# Patient Record
Sex: FEMALE | Race: AMERICAN INDIAN OR ALASKA NATIVE | HISPANIC OR LATINO | Employment: FULL TIME | URBAN - METROPOLITAN AREA
[De-identification: names, ages, dates, MRNs, and addresses within clinical notes are randomized per-mention and may not be internally consistent; named-entity substitution may affect disease eponyms.]

---

## 2017-02-23 DIAGNOSIS — N92.0 MENORRHAGIA WITH REGULAR CYCLE: ICD-10-CM

## 2017-02-23 RX ORDER — ACETAMINOPHEN AND CODEINE PHOSPHATE 120; 12 MG/5ML; MG/5ML
1 SOLUTION ORAL DAILY
Qty: 84 TABLET | Refills: 1 | Status: SHIPPED | OUTPATIENT
Start: 2017-02-23 | End: 2017-05-16

## 2017-05-17 ENCOUNTER — OFFICE VISIT (OUTPATIENT)
Dept: FAMILY MEDICINE | Facility: CLINIC | Age: 42
End: 2017-05-17
Payer: COMMERCIAL

## 2017-05-17 VITALS
SYSTOLIC BLOOD PRESSURE: 115 MMHG | TEMPERATURE: 97.2 F | BODY MASS INDEX: 29.06 KG/M2 | WEIGHT: 148 LBS | HEART RATE: 90 BPM | DIASTOLIC BLOOD PRESSURE: 75 MMHG | HEIGHT: 60 IN | RESPIRATION RATE: 15 BRPM | OXYGEN SATURATION: 97 %

## 2017-05-17 DIAGNOSIS — E87.6 HYPOKALEMIA: ICD-10-CM

## 2017-05-17 DIAGNOSIS — I10 BENIGN ESSENTIAL HYPERTENSION: Primary | ICD-10-CM

## 2017-05-17 DIAGNOSIS — F32.1 MODERATE SINGLE CURRENT EPISODE OF MAJOR DEPRESSIVE DISORDER (H): ICD-10-CM

## 2017-05-17 DIAGNOSIS — N92.0 MENORRHAGIA WITH REGULAR CYCLE: ICD-10-CM

## 2017-05-17 LAB
ANION GAP SERPL CALCULATED.3IONS-SCNC: 9 MMOL/L (ref 3–14)
BUN SERPL-MCNC: 24 MG/DL (ref 7–30)
CALCIUM SERPL-MCNC: 9.1 MG/DL (ref 8.5–10.1)
CHLORIDE SERPL-SCNC: 101 MMOL/L (ref 94–109)
CO2 SERPL-SCNC: 29 MMOL/L (ref 20–32)
CREAT SERPL-MCNC: 0.87 MG/DL (ref 0.52–1.04)
GFR SERPL CREATININE-BSD FRML MDRD: 71 ML/MIN/1.7M2
GLUCOSE SERPL-MCNC: 112 MG/DL (ref 70–99)
POTASSIUM SERPL-SCNC: 3.1 MMOL/L (ref 3.4–5.3)
SODIUM SERPL-SCNC: 139 MMOL/L (ref 133–144)

## 2017-05-17 PROCEDURE — 36415 COLL VENOUS BLD VENIPUNCTURE: CPT | Performed by: FAMILY MEDICINE

## 2017-05-17 PROCEDURE — 99214 OFFICE O/P EST MOD 30 MIN: CPT | Performed by: FAMILY MEDICINE

## 2017-05-17 PROCEDURE — 80048 BASIC METABOLIC PNL TOTAL CA: CPT | Performed by: FAMILY MEDICINE

## 2017-05-17 RX ORDER — ACETAMINOPHEN AND CODEINE PHOSPHATE 120; 12 MG/5ML; MG/5ML
1 SOLUTION ORAL DAILY
Qty: 84 TABLET | Refills: 3 | Status: SHIPPED | OUTPATIENT
Start: 2017-05-17 | End: 2018-04-20

## 2017-05-17 RX ORDER — CHLORTHALIDONE 50 MG/1
50 TABLET ORAL DAILY
Qty: 90 TABLET | Refills: 1 | Status: SHIPPED | OUTPATIENT
Start: 2017-05-17 | End: 2017-11-08

## 2017-05-17 RX ORDER — LISINOPRIL 10 MG/1
10 TABLET ORAL DAILY
Qty: 90 TABLET | Refills: 1 | Status: SHIPPED | OUTPATIENT
Start: 2017-05-17 | End: 2017-11-08

## 2017-05-17 ASSESSMENT — PAIN SCALES - GENERAL: PAINLEVEL: NO PAIN (0)

## 2017-05-17 ASSESSMENT — ANXIETY QUESTIONNAIRES
7. FEELING AFRAID AS IF SOMETHING AWFUL MIGHT HAPPEN: NOT AT ALL
3. WORRYING TOO MUCH ABOUT DIFFERENT THINGS: NEARLY EVERY DAY
5. BEING SO RESTLESS THAT IT IS HARD TO SIT STILL: NOT AT ALL
GAD7 TOTAL SCORE: 10
IF YOU CHECKED OFF ANY PROBLEMS ON THIS QUESTIONNAIRE, HOW DIFFICULT HAVE THESE PROBLEMS MADE IT FOR YOU TO DO YOUR WORK, TAKE CARE OF THINGS AT HOME, OR GET ALONG WITH OTHER PEOPLE: SOMEWHAT DIFFICULT
6. BECOMING EASILY ANNOYED OR IRRITABLE: NEARLY EVERY DAY
2. NOT BEING ABLE TO STOP OR CONTROL WORRYING: MORE THAN HALF THE DAYS
1. FEELING NERVOUS, ANXIOUS, OR ON EDGE: NOT AT ALL

## 2017-05-17 ASSESSMENT — PATIENT HEALTH QUESTIONNAIRE - PHQ9: 5. POOR APPETITE OR OVEREATING: MORE THAN HALF THE DAYS

## 2017-05-17 NOTE — PROGRESS NOTES
"  SUBJECTIVE:                                                    Sue Delgado is a 42 year old female who presents to clinic today for the following health issues:      Hypertension Follow-up      Outpatient blood pressures are not being checked.    Low Salt Diet: low salt       Amount of exercise or physical activity: 2-3 days/week for an average of 30-45 minutes    Problems taking medications regularly: No    Medication side effects: none    Diet: regular (no restrictions)    Pt with HTN, well controlled on medication/ due for labwork  On OCPs and working well. Needs refill. UTD on pap smear    Patient would like to discuss feeling emotional lately, she is concerned she maybe starting Menopause.  Pt crying most days. Feeling depressed. No thoughts of hurting herself. Is struggling with her marriage.   Did see marriage counselor but then  broke his shoulder and has not been back. She did not find her helpful.   Is willing to try individual therapy as she is not sure she wants to stay in the marriage.       Problem list and histories reviewed & adjusted, as indicated.  Additional history: as documented    Labs reviewed in EPIC    Reviewed and updated as needed this visit by clinical staff  Tobacco  Meds  Med Hx  Surg Hx  Fam Hx  Soc Hx      Reviewed and updated as needed this visit by Provider         ROS:  Constitutional, HEENT, cardiovascular, pulmonary, gi and gu systems are negative, except as otherwise noted.    OBJECTIVE:                                                    /75  Pulse 90  Temp 97.2  F (36.2  C) (Oral)  Resp 15  Ht 4' 11.65\" (1.515 m)  Wt 148 lb (67.1 kg)  SpO2 97%  Breastfeeding? No  BMI 29.25 kg/m2  Body mass index is 29.25 kg/(m^2).  GENERAL: healthy, alert and no distress  NECK: no adenopathy, no asymmetry, masses, or scars and thyroid normal to palpation  RESP: lungs clear to auscultation - no rales, rhonchi or wheezes  CV: regular rate and rhythm, " normal S1 S2, no S3 or S4, no murmur, click or rub, no peripheral edema and peripheral pulses strong  ABDOMEN: soft, nontender, no hepatosplenomegaly, no masses and bowel sounds normal  MS: no gross musculoskeletal defects noted, no edema    Diagnostic Test Results:  none      ASSESSMENT/PLAN:                                                            1. Benign essential hypertension  At goal on meds, fu in 6 months  - Basic metabolic panel  - chlorthalidone (HYGROTON) 50 MG tablet; Take 1 tablet (50 mg) by mouth daily  Dispense: 90 tablet; Refill: 1  - lisinopril (PRINIVIL/ZESTRIL) 10 MG tablet; Take 1 tablet (10 mg) by mouth daily  Dispense: 90 tablet; Refill: 1    2. Menorrhagia with regular cycle  Refill as needed  - norethindrone (MICRONOR) 0.35 MG per tablet; Take 1 tablet (0.35 mg) by mouth daily  Dispense: 84 tablet; Refill: 3    3. Moderate single current episode of major depressive disorder (H)  To fu with [facility].           Radha Marcial MD  [facility]

## 2017-05-17 NOTE — MR AVS SNAPSHOT
"              After Visit Summary   5/17/2017    Sue Delgado    MRN: [id number]           Patient Information     Date Of Birth          1975        Visit Information        Provider Department      5/17/2017 3:40 PM Radha Garnett MD [facility]        Today's Diagnoses     Benign essential hypertension    -  1    Menorrhagia with regular cycle        Moderate single current episode of major depressive disorder (H)           Follow-ups after your visit        Who to contact     If you have questions or need follow up information about today's clinic visit or your schedule please contact [facility] directly at [phone].  Normal or non-critical lab and imaging results will be communicated to you by MyChart, letter or phone within 4 business days after the clinic has received the results. If you do not hear from us within 7 days, please contact the clinic through Diaphonicshart or phone. If you have a critical or abnormal lab result, we will notify you by phone as soon as possible.  Submit refill requests through Beauty [employer]ed or call your pharmacy and they will forward the refill request to us. Please allow 3 business days for your refill to be completed.          Additional Information About Your Visit        MyChart Information     [employer] gives you secure access to your electronic health record. If you see a primary care provider, you can also send messages to your care team and make appointments. If you have questions, please call your primary care clinic.  If you do not have a primary care provider, please call [phone] and they will assist you.        Care EveryWhere ID     This is your Care EveryWhere ID. This could be used by other organizations to access your [place] medical records  [id number]        Your Vitals Were     Pulse Temperature Respirations Height Pulse Oximetry Breastfeeding?    90 97.2  F (36.2  C) (Oral) 15 4' 11.65\" (1.515 m) 97% No    BMI (Body Mass " Index)                   29.25 kg/m2            Blood Pressure from Last 3 Encounters:   05/17/17 115/75   11/14/16 139/86   10/20/16 120/71    Weight from Last 3 Encounters:   05/17/17 148 lb (67.1 kg)   11/14/16 163 lb (73.9 kg)   10/20/16 158 lb (71.7 kg)              We Performed the Following     Basic metabolic panel          Where to get your medicines      These medications were sent to [facility] [address], [address]  [address], [address], [facility] [address]     Phone:  [phone]     chlorthalidone 50 MG tablet    lisinopril 10 MG tablet    norethindrone 0.35 MG per tablet          Primary Care Provider Office Phone # Fax #    Radha Garnett [phone]       [facility] [address] [facility] [address]        Thank you!     Thank you for choosing [facility]  for your care. Our goal is always to provide you with excellent care. Hearing back from our patients is one way we can continue to improve our services. Please take a few minutes to complete the written survey that you may receive in the mail after your visit with us. Thank you!             Your Updated Medication List - Protect others around you: Learn how to safely use, store and throw away your medicines at www.disposemymeds.org.          This list is accurate as of: 5/17/17  6:14 PM.  Always use your most recent med list.                   Brand Name Dispense Instructions for use    chlorthalidone 50 MG tablet    HYGROTON    90 tablet    Take 1 tablet (50 mg) by mouth daily       lisinopril 10 MG tablet    PRINIVIL/ZESTRIL    90 tablet    Take 1 tablet (10 mg) by mouth daily       norethindrone 0.35 MG per tablet    MICRONOR    84 tablet    Take 1 tablet (0.35 mg) by mouth daily

## 2017-05-18 ASSESSMENT — PATIENT HEALTH QUESTIONNAIRE - PHQ9: SUM OF ALL RESPONSES TO PHQ QUESTIONS 1-9: 16

## 2017-05-18 ASSESSMENT — ANXIETY QUESTIONNAIRES: GAD7 TOTAL SCORE: 10

## 2017-07-14 ENCOUNTER — TELEPHONE (OUTPATIENT)
Dept: FAMILY MEDICINE | Facility: CLINIC | Age: 42
End: 2017-07-14

## 2017-11-08 ENCOUNTER — OFFICE VISIT (OUTPATIENT)
Dept: FAMILY MEDICINE | Facility: CLINIC | Age: 42
End: 2017-11-08
Payer: COMMERCIAL

## 2017-11-08 VITALS
WEIGHT: 140 LBS | TEMPERATURE: 97.1 F | HEART RATE: 71 BPM | DIASTOLIC BLOOD PRESSURE: 66 MMHG | BODY MASS INDEX: 27.67 KG/M2 | SYSTOLIC BLOOD PRESSURE: 118 MMHG

## 2017-11-08 DIAGNOSIS — I10 BENIGN ESSENTIAL HYPERTENSION: ICD-10-CM

## 2017-11-08 LAB
ANION GAP SERPL CALCULATED.3IONS-SCNC: 8 MMOL/L (ref 3–14)
BUN SERPL-MCNC: 18 MG/DL (ref 7–30)
CALCIUM SERPL-MCNC: 9.2 MG/DL (ref 8.5–10.1)
CHLORIDE SERPL-SCNC: 101 MMOL/L (ref 94–109)
CO2 SERPL-SCNC: 28 MMOL/L (ref 20–32)
CREAT SERPL-MCNC: 0.64 MG/DL (ref 0.52–1.04)
GFR SERPL CREATININE-BSD FRML MDRD: >90 ML/MIN/1.7M2
GLUCOSE SERPL-MCNC: 105 MG/DL (ref 70–99)
POTASSIUM SERPL-SCNC: 3.1 MMOL/L (ref 3.4–5.3)
SODIUM SERPL-SCNC: 137 MMOL/L (ref 133–144)

## 2017-11-08 PROCEDURE — 99213 OFFICE O/P EST LOW 20 MIN: CPT | Performed by: FAMILY MEDICINE

## 2017-11-08 PROCEDURE — 80048 BASIC METABOLIC PNL TOTAL CA: CPT | Performed by: FAMILY MEDICINE

## 2017-11-08 PROCEDURE — 36415 COLL VENOUS BLD VENIPUNCTURE: CPT | Performed by: FAMILY MEDICINE

## 2017-11-08 RX ORDER — CHLORTHALIDONE 50 MG/1
50 TABLET ORAL DAILY
Qty: 90 TABLET | Refills: 1 | Status: SHIPPED | OUTPATIENT
Start: 2017-11-08 | End: 2018-04-26

## 2017-11-08 RX ORDER — LISINOPRIL 10 MG/1
10 TABLET ORAL DAILY
Qty: 90 TABLET | Refills: 1 | Status: SHIPPED | OUTPATIENT
Start: 2017-11-08 | End: 2018-04-26

## 2017-11-08 ASSESSMENT — ANXIETY QUESTIONNAIRES
3. WORRYING TOO MUCH ABOUT DIFFERENT THINGS: SEVERAL DAYS
IF YOU CHECKED OFF ANY PROBLEMS ON THIS QUESTIONNAIRE, HOW DIFFICULT HAVE THESE PROBLEMS MADE IT FOR YOU TO DO YOUR WORK, TAKE CARE OF THINGS AT HOME, OR GET ALONG WITH OTHER PEOPLE: SOMEWHAT DIFFICULT
1. FEELING NERVOUS, ANXIOUS, OR ON EDGE: NOT AT ALL
5. BEING SO RESTLESS THAT IT IS HARD TO SIT STILL: NOT AT ALL
7. FEELING AFRAID AS IF SOMETHING AWFUL MIGHT HAPPEN: SEVERAL DAYS
GAD7 TOTAL SCORE: 5
2. NOT BEING ABLE TO STOP OR CONTROL WORRYING: SEVERAL DAYS
6. BECOMING EASILY ANNOYED OR IRRITABLE: SEVERAL DAYS

## 2017-11-08 ASSESSMENT — PATIENT HEALTH QUESTIONNAIRE - PHQ9
SUM OF ALL RESPONSES TO PHQ QUESTIONS 1-9: 10
5. POOR APPETITE OR OVEREATING: SEVERAL DAYS

## 2017-11-08 NOTE — PROGRESS NOTES
SUBJECTIVE:   Sue Delgado is a 42 year old female who presents to clinic today for the following health issues:        Hypertension Follow-up      Outpatient blood pressures are not being checked.    Low Salt Diet: not monitoring salt    Insomnia, tried nyquil and melatonin going through a divorce       Amount of exercise or physical activity: 5 days a week     Problems taking medications regularly: No    Medication side effects: none    Diet: regular (no restrictions)        Pt with HTN, well controlled on meds,   No concerns per pt.     Problem list and histories reviewed & adjusted, as indicated.  Additional history: as documented    Labs reviewed in EPIC    Reviewed and updated as needed this visit by clinical staffTobacco  Allergies  Meds       Reviewed and updated as needed this visit by Provider         ROS:  Constitutional, HEENT, cardiovascular, pulmonary, gi and gu systems are negative, except as otherwise noted.      OBJECTIVE:   /66  Pulse 71  Temp 97.1  F (36.2  C) (Oral)  Wt 140 lb (63.5 kg)  BMI 27.67 kg/m2  Body mass index is 27.67 kg/(m^2).  GENERAL: healthy, alert and no distress  NECK: no adenopathy, no asymmetry, masses, or scars and thyroid normal to palpation  RESP: lungs clear to auscultation - no rales, rhonchi or wheezes  CV: regular rate and rhythm, normal S1 S2, no S3 or S4, no murmur, click or rub, no peripheral edema and peripheral pulses strong  ABDOMEN: soft, nontender, no hepatosplenomegaly, no masses and bowel sounds normal  MS: no gross musculoskeletal defects noted, no edema    Diagnostic Test Results:  none     ASSESSMENT/PLAN:     1. Benign essential hypertension  At goal on meds, fu in 6-12 months  - Basic metabolic panel  - chlorthalidone (HYGROTON) 50 MG tablet; Take 1 tablet (50 mg) by mouth daily  Dispense: 90 tablet; Refill: 1  - lisinopril (PRINIVIL/ZESTRIL) 10 MG tablet; Take 1 tablet (10 mg) by mouth daily  Dispense: 90 tablet; Refill:  1        Radha Marcial MD  [facility]

## 2017-11-08 NOTE — MR AVS SNAPSHOT
After Visit Summary   11/8/2017    Sue Delgado    MRN: [id number]           Patient Information     Date Of Birth          1975        Visit Information        Provider Department      11/8/2017 12:00 PM Radha Garnett MD [facility]        Today's Diagnoses     Benign essential hypertension           Follow-ups after your visit        Who to contact     If you have questions or need follow up information about today's clinic visit or your schedule please contact [facility] directly at [phone].  Normal or non-critical lab and imaging results will be communicated to you by MyChart, letter or phone within 4 business days after the clinic has received the results. If you do not hear from us within 7 days, please contact the clinic through SpunLivet or phone. If you have a critical or abnormal lab result, we will notify you by phone as soon as possible.  Submit refill requests through [employer] or call your pharmacy and they will forward the refill request to us. Please allow 3 business days for your refill to be completed.          Additional Information About Your Visit        MyChart Information     [employer] gives you secure access to your electronic health record. If you see a primary care provider, you can also send messages to your care team and make appointments. If you have questions, please call your primary care clinic.  If you do not have a primary care provider, please call [phone] and they will assist you.        Care EveryWhere ID     This is your Care EveryWhere ID. This could be used by other organizations to access your [place] medical records  [id number]        Your Vitals Were     Pulse Temperature BMI (Body Mass Index)             71 97.1  F (36.2  C) (Oral) 27.67 kg/m2          Blood Pressure from Last 3 Encounters:   11/08/17 118/66   05/17/17 115/75   11/14/16 139/86    Weight from Last 3 Encounters:   11/08/17 140 lb (63.5 kg)    05/17/17 148 lb (67.1 kg)   11/14/16 163 lb (73.9 kg)              We Performed the Following     Basic metabolic panel          Where to get your medicines      These medications were sent to [facility] [place], [place] - [address] [address]  [address] [address], [address], [facility] [address]     Phone:  [phone]     chlorthalidone 50 MG tablet    lisinopril 10 MG tablet          Primary Care Provider Office Phone # Fax #    Radha Garnett [phone]       [address] [facility] [address]        Equal Access to Services     [facility]: Hadii jacob lucio Soian, waaxda luqadaha, qaybta kaalmada adelillieyanalini, edita benson . So [facility] [phone].    ATENCIÓN: Si habla español, tiene a peoples disposición servicios gratuitos de asistencia lingüística. [facility] [phone].    We comply with applicable federal civil rights laws and [place] laws. We do not discriminate on the basis of race, color, national origin, age, disability, sex, sexual orientation, or gender identity.            Thank you!     Thank you for choosing [facility]  for your care. Our goal is always to provide you with excellent care. Hearing back from our patients is one way we can continue to improve our services. Please take a few minutes to complete the written survey that you may receive in the mail after your visit with us. Thank you!             Your Updated Medication List - Protect others around you: Learn how to safely use, store and throw away your medicines at www.disposemymeds.org.          This list is accurate as of: 11/8/17  3:54 PM.  Always use your most recent med list.                   Brand Name Dispense Instructions for use Diagnosis    chlorthalidone 50 MG tablet    HYGROTON    90 tablet    Take 1 tablet (50 mg) by mouth daily    Benign essential hypertension       lisinopril 10 MG tablet    PRINIVIL/ZESTRIL    90 tablet    Take 1 tablet  (10 mg) by mouth daily    Benign essential hypertension       norethindrone 0.35 MG per tablet    MICRONOR    84 tablet    Take 1 tablet (0.35 mg) by mouth daily    Menorrhagia with regular cycle

## 2017-11-08 NOTE — NURSING NOTE
"Chief Complaint   Patient presents with     Insomnia     Hypertension       Initial /66  Pulse 71  Temp 97.1  F (36.2  C) (Oral)  Wt 140 lb (63.5 kg)  BMI 27.67 kg/m2 Estimated body mass index is 27.67 kg/(m^2) as calculated from the following:    Height as of 5/17/17: 4' 11.65\" (1.515 m).    Weight as of this encounter: 140 lb (63.5 kg).  Medication Reconciliation: irineo Lakhani MA      "

## 2017-11-09 ASSESSMENT — ANXIETY QUESTIONNAIRES: GAD7 TOTAL SCORE: 5

## 2018-03-28 ENCOUNTER — OFFICE VISIT (OUTPATIENT)
Dept: FAMILY MEDICINE | Facility: CLINIC | Age: 43
End: 2018-03-28
Payer: COMMERCIAL

## 2018-03-28 VITALS
DIASTOLIC BLOOD PRESSURE: 66 MMHG | TEMPERATURE: 97.4 F | RESPIRATION RATE: 16 BRPM | WEIGHT: 136 LBS | OXYGEN SATURATION: 96 % | SYSTOLIC BLOOD PRESSURE: 115 MMHG | HEIGHT: 60 IN | BODY MASS INDEX: 26.7 KG/M2 | HEART RATE: 85 BPM

## 2018-03-28 DIAGNOSIS — R68.89 FLU-LIKE SYMPTOMS: Primary | ICD-10-CM

## 2018-03-28 PROCEDURE — 99213 OFFICE O/P EST LOW 20 MIN: CPT | Performed by: FAMILY MEDICINE

## 2018-03-28 NOTE — PROGRESS NOTES
SUBJECTIVE:   Sue Delgado is a 43 year old female who presents to clinic today for the following health issues:      ENT Symptoms             Symptoms: cc Present Absent Comment   Fever/Chills  x     Fatigue  x     Muscle Aches  x     Eye Irritation   x    Sneezing   x    Nasal Medardo/Drg  x     Sinus Pressure/Pain  x     Loss of smell  x     Dental pain   x    Sore Throat  x  Slight    Swollen Glands   x    Ear Pain/Fullness  x     Cough  x     Wheeze  x     Chest Pain  x  With cough    Shortness of breath   x    Rash   x    Other  x  Light headed     Symptom duration:  7 days    Symptom severity:     Treatments tried: Night quil and day quil, robitussin, vitamin c    Contacts:  work     Needs a note for work    Pt with symptoms above consistent with flu.  She needs note for work  She is starting to feel better just a cough remaining          Problem list and histories reviewed & adjusted, as indicated.  Additional history: as documented    Labs reviewed in EPIC    Reviewed and updated as needed this visit by clinical staff  Tobacco  Allergies  Meds  Med Hx  Surg Hx  Fam Hx  Soc Hx      Reviewed and updated as needed this visit by Provider         ROS:  Constitutional, HEENT, cardiovascular, pulmonary, gi and gu systems are negative, except as otherwise noted.    OBJECTIVE:     /66  Pulse 85  Temp 97.4  F (36.3  C) (Oral)  Resp 16  Ht 5' (1.524 m)  Wt 136 lb (61.7 kg)  SpO2 96%  BMI 26.56 kg/m2  Body mass index is 26.56 kg/(m^2).  GENERAL: healthy, alert and no distress  EYES: Eyes grossly normal to inspection, PERRL and conjunctivae and sclerae normal  HENT: ear canals and TM's normal, nose and mouth without ulcers or lesions  NECK: no adenopathy, no asymmetry, masses, or scars and thyroid normal to palpation  RESP: lungs clear to auscultation - no rales, rhonchi or wheezes  CV: regular rate and rhythm, normal S1 S2, no S3 or S4, no murmur, click or rub, no peripheral edema and  peripheral pulses strong    Diagnostic Test Results:  none     ASSESSMENT/PLAN:     1. Flu-like symptoms  Symptomatic treatment, note for work, okay to return  FU with new or worsening symptoms          Radha Marcial MD  [facility]

## 2018-03-28 NOTE — MR AVS SNAPSHOT
After Visit Summary   3/28/2018    Sue Delgado    MRN: [id number]           Patient Information     Date Of Birth          1975        Visit Information        Provider Department      3/28/2018 2:00 PM Radha Garnett MD [facility]        Today's Diagnoses     Flu-like symptoms    -  1       Follow-ups after your visit        Who to contact     If you have questions or need follow up information about today's clinic visit or your schedule please contact [facility] directly at [phone].  Normal or non-critical lab and imaging results will be communicated to you by Smappohart, letter or phone within 4 business days after the clinic has received the results. If you do not hear from us within 7 days, please contact the clinic through Nujirat or phone. If you have a critical or abnormal lab result, we will notify you by phone as soon as possible.  Submit refill requests through [employer] or call your pharmacy and they will forward the refill request to us. Please allow 3 business days for your refill to be completed.          Additional Information About Your Visit        MyChart Information     [employer] gives you secure access to your electronic health record. If you see a primary care provider, you can also send messages to your care team and make appointments. If you have questions, please call your primary care clinic.  If you do not have a primary care provider, please call [phone] and they will assist you.        Care EveryWhere ID     This is your Care EveryWhere ID. This could be used by other organizations to access your [facility] medical records  [id number]        Your Vitals Were     Pulse Temperature Respirations Height Pulse Oximetry BMI (Body Mass Index)    85 97.4  F (36.3  C) (Oral) 16 5' (1.524 m) 96% 26.56 kg/m2       Blood Pressure from Last 3 Encounters:   03/28/18 115/66   11/08/17 118/66   05/17/17 115/75    Weight from Last 3  Encounters:   03/28/18 136 lb (61.7 kg)   11/08/17 140 lb (63.5 kg)   05/17/17 148 lb (67.1 kg)              Today, you had the following     No orders found for display       Primary Care Provider Office Phone # Fax #    Radha Garnett [phone] [address] [facility] [address]        Equal Access to Services     MARCELLE CUEVAS : Hadii aad ku hadasho Soomaali, waaxda luqadaha, qaybta kaalmada adeegyada, waxay idiin hayaan adeeg jamildeisipavan labritt . So [facility] [phone].    ATENCIÓN: Si audila [place], tiene a peoples disposición servicios gratuitos de asistencia lingüística. Llame al [phone].    We comply with applicable federal civil rights laws and [place] laws. We do not discriminate on the basis of race, color, national origin, age, disability, sex, sexual orientation, or gender identity.            Thank you!     Thank you for choosing [facility]  for your care. Our goal is always to provide you with excellent care. Hearing back from our patients is one way we can continue to improve our services. Please take a few minutes to complete the written survey that you may receive in the mail after your visit with us. Thank you!             Your Updated Medication List - Protect others around you: Learn how to safely use, store and throw away your medicines at www.disposemymeds.org.          This list is accurate as of 3/28/18  2:26 PM.  Always use your most recent med list.                   Brand Name Dispense Instructions for use Diagnosis    chlorthalidone 50 MG tablet    HYGROTON    90 tablet    Take 1 tablet (50 mg) by mouth daily    Benign essential hypertension       lisinopril 10 MG tablet    PRINIVIL/ZESTRIL    90 tablet    Take 1 tablet (10 mg) by mouth daily    Benign essential hypertension       norethindrone 0.35 MG per tablet    MICRONOR    84 tablet    Take 1 tablet (0.35 mg) by mouth daily    Menorrhagia with regular cycle

## 2018-04-04 ENCOUNTER — TELEPHONE (OUTPATIENT)
Dept: FAMILY MEDICINE | Facility: CLINIC | Age: 43
End: 2018-04-04

## 2018-04-20 DIAGNOSIS — N92.0 MENORRHAGIA WITH REGULAR CYCLE: ICD-10-CM

## 2018-04-20 RX ORDER — ACETAMINOPHEN AND CODEINE PHOSPHATE 120; 12 MG/5ML; MG/5ML
1 SOLUTION ORAL DAILY
Qty: 84 TABLET | Refills: 0 | Status: SHIPPED | OUTPATIENT
Start: 2018-04-20 | End: 2018-04-26

## 2018-04-20 NOTE — LETTER
April 23, 2018    Sue Delgado  [address] [facility] [address]    Dear Sue,       We recently received a refill request for norethindrone (MICRONOR) 0.35 MG per tablet.  We have refilled this for a one time supply only because you are due for a:    Blood pressure/Birth control office visit      Please call at your earliest convenience so that there will not be a delay with your future refills.          Thank you,   Your [facility] Team/mkr  [phone]

## 2018-04-26 NOTE — PROGRESS NOTES
SUBJECTIVE:   CC: Sue Delgado is an 43 year old woman who presents for preventive health visit.     Physical   Annual:     Getting at least 3 servings of Calcium per day::  NO    Bi-annual eye exam::  Yes    Dental care twice a year::  NO    Sleep apnea or symptoms of sleep apnea::  Daytime drowsiness    Diet::  Regular (no restrictions)    Frequency of exercise::  2-3 days/week    Duration of exercise::  15-30 minutes    Taking medications regularly::  Yes    Medication side effects::  Not applicable    Additional concerns today::  YES                  Today's PHQ-2 Score:   PHQ-2 ( 1999 [employer]) 4/30/2018   Q1: Little interest or pleasure in doing things 0   Q2: Feeling down, depressed or hopeless 0   PHQ-2 Score 0   Q1: Little interest or pleasure in doing things Not at all   Q2: Feeling down, depressed or hopeless Not at all   PHQ-2 Score 0       Abuse: Current or Past(Physical, Sexual or Emotional)- No  Do you feel safe in your environment - Yes    Social History   Substance Use Topics     Smoking status: Never Smoker     Smokeless tobacco: Never Used     Alcohol use Yes     Alcohol Use 4/30/2018   If you drink alcohol do you typically have greater than 3 drinks per day OR greater than 7 drinks per week? No   No flowsheet data found. less than 7 drinks per week.    Reviewed orders with patient.  Reviewed health maintenance and updated orders accordingly - Yes  Labs reviewed in EPIC    Patient under age 50, mutual decision reflected in health maintenance.      Pertinent mammograms are reviewed under the imaging tab.  History of abnormal Pap smear: NO - age 30-65 PAP every 5 years with negative HPV co-testing recommended    Reviewed and updated as needed this visit by clinical staff  Tobacco  Allergies  Meds  Med Hx  Surg Hx  Fam Hx  Soc Hx        Reviewed and updated as needed this visit by Provider            Review of Systems  CONSTITUTIONAL: NEGATIVE for fever, chills, change in  weight  INTEGUMENTARU/SKIN: NEGATIVE for worrisome rashes, moles or lesions  EYES: NEGATIVE for vision changes or irritation  ENT: NEGATIVE for ear, mouth and throat problems  RESP: NEGATIVE for significant cough or SOB  BREAST: NEGATIVE for masses, tenderness or discharge  CV: NEGATIVE for chest pain, palpitations or peripheral edema  GI: NEGATIVE for nausea, abdominal pain, heartburn, or change in bowel habits  : NEGATIVE for unusual urinary or vaginal symptoms. Periods are regular.  MUSCULOSKELETAL: NEGATIVE for significant arthralgias or myalgia  NEURO: NEGATIVE for weakness, dizziness or paresthesias  PSYCHIATRIC: NEGATIVE for changes in mood or affect     OBJECTIVE:   /72  Pulse 86  Temp 97.8  F (36.6  C) (Oral)  Resp 15  Ht 5' (1.524 m)  Wt 136 lb (61.7 kg)  LMP 04/02/2018  BMI 26.56 kg/m2  Physical Exam  GENERAL: healthy, alert and no distress  EYES: Eyes grossly normal to inspection, PERRL and conjunctivae and sclerae normal  HENT: ear canals and TM's normal, nose and mouth without ulcers or lesions  NECK: no adenopathy, no asymmetry, masses, or scars and thyroid normal to palpation  RESP: lungs clear to auscultation - no rales, rhonchi or wheezes  BREAST: normal without masses, tenderness or nipple discharge and no palpable axillary masses or adenopathy  CV: regular rate and rhythm, normal S1 S2, no S3 or S4, no murmur, click or rub, no peripheral edema and peripheral pulses strong  ABDOMEN: soft, nontender, no hepatosplenomegaly, no masses and bowel sounds normal   (female): normal female external genitalia, normal urethral meatus, vaginal mucosa pink, moist, well rugated, and normal cervix/adnexa/uterus without masses or discharge  MS: no gross musculoskeletal defects noted, no edema  SKIN: no suspicious lesions or rashes  NEURO: Normal strength and tone, mentation intact and speech normal  PSYCH: mentation appears normal, affect normal/bright    ASSESSMENT/PLAN:   1. Routine general  medical examination at a health care facility      2. Benign essential hypertension  At goal on meds, fi in one year  - chlorthalidone (HYGROTON) 50 MG tablet; Take 1 tablet (50 mg) by mouth daily  Dispense: 90 tablet; Refill: 3  - lisinopril (PRINIVIL/ZESTRIL) 10 MG tablet; Take 1 tablet (10 mg) by mouth daily  Dispense: 90 tablet; Refill: 3  - Basic metabolic panel    3. Menorrhagia with regular cycle  Refill is working well  - norethindrone (MICRONOR) 0.35 MG per tablet; Take 1 tablet (0.35 mg) by mouth daily  Dispense: 84 tablet; Refill: 3    4. CARDIOVASCULAR SCREENING; LDL GOAL LESS THAN 160    - Lipid panel reflex to direct LDL Fasting    5. Encounter for screening mammogram for breast cancer    - MA Screening Digital Bilateral; Future    6. Screening for malignant neoplasm of cervix    - Pap imaged thin layer screen with HPV - recommended age 30 - 65 years (select HPV order below)  - HPV High Risk Types DNA Cervical    COUNSELING:  Reviewed preventive health counseling, as reflected in patient instructions       Regular exercise       Healthy diet/nutrition       Vision screening       Contraception         reports that she has never smoked. She has never used smokeless tobacco.    Estimated body mass index is 26.56 kg/(m^2) as calculated from the following:    Height as of this encounter: 5' (1.524 m).    Weight as of this encounter: 136 lb (61.7 kg).   Weight management plan: Discussed healthy diet and exercise guidelines and patient will follow up in 12 months in clinic to re-evaluate.    Counseling Resources:  ATP IV Guidelines  Pooled Cohorts Equation Calculator  Breast Cancer Risk Calculator  FRAX Risk Assessment  ICSI Preventive Guidelines  Dietary Guidelines for Americans, 2010  [employer]'s MyPlate  ASA Prophylaxis  Lung CA Screening    Radha Marcial MD  [facility]  Answers for HPI/ROS submitted by the patient on 4/30/2018   PHQ-2 Score: 0

## 2018-04-30 ENCOUNTER — OFFICE VISIT (OUTPATIENT)
Dept: FAMILY MEDICINE | Facility: CLINIC | Age: 43
End: 2018-04-30
Payer: COMMERCIAL

## 2018-04-30 VITALS
RESPIRATION RATE: 15 BRPM | SYSTOLIC BLOOD PRESSURE: 129 MMHG | TEMPERATURE: 97.8 F | WEIGHT: 136 LBS | HEIGHT: 60 IN | HEART RATE: 86 BPM | DIASTOLIC BLOOD PRESSURE: 72 MMHG | BODY MASS INDEX: 26.7 KG/M2

## 2018-04-30 DIAGNOSIS — Z13.6 CARDIOVASCULAR SCREENING; LDL GOAL LESS THAN 160: ICD-10-CM

## 2018-04-30 DIAGNOSIS — N92.0 MENORRHAGIA WITH REGULAR CYCLE: ICD-10-CM

## 2018-04-30 DIAGNOSIS — Z12.4 SCREENING FOR MALIGNANT NEOPLASM OF CERVIX: ICD-10-CM

## 2018-04-30 DIAGNOSIS — I10 BENIGN ESSENTIAL HYPERTENSION: ICD-10-CM

## 2018-04-30 DIAGNOSIS — Z12.31 ENCOUNTER FOR SCREENING MAMMOGRAM FOR BREAST CANCER: ICD-10-CM

## 2018-04-30 DIAGNOSIS — Z00.00 ROUTINE GENERAL MEDICAL EXAMINATION AT A HEALTH CARE FACILITY: Primary | ICD-10-CM

## 2018-04-30 LAB
ANION GAP SERPL CALCULATED.3IONS-SCNC: 6 MMOL/L (ref 3–14)
BUN SERPL-MCNC: 15 MG/DL (ref 7–30)
CALCIUM SERPL-MCNC: 8.7 MG/DL (ref 8.5–10.1)
CHLORIDE SERPL-SCNC: 103 MMOL/L (ref 94–109)
CHOLEST SERPL-MCNC: 163 MG/DL
CO2 SERPL-SCNC: 29 MMOL/L (ref 20–32)
CREAT SERPL-MCNC: 0.7 MG/DL (ref 0.52–1.04)
GFR SERPL CREATININE-BSD FRML MDRD: >90 ML/MIN/1.7M2
GLUCOSE SERPL-MCNC: 98 MG/DL (ref 70–99)
HDLC SERPL-MCNC: 64 MG/DL
LDLC SERPL CALC-MCNC: 90 MG/DL
NONHDLC SERPL-MCNC: 99 MG/DL
POTASSIUM SERPL-SCNC: 3.1 MMOL/L (ref 3.4–5.3)
SODIUM SERPL-SCNC: 138 MMOL/L (ref 133–144)
TRIGL SERPL-MCNC: 44 MG/DL

## 2018-04-30 PROCEDURE — 80048 BASIC METABOLIC PNL TOTAL CA: CPT | Performed by: FAMILY MEDICINE

## 2018-04-30 PROCEDURE — 99396 PREV VISIT EST AGE 40-64: CPT | Performed by: FAMILY MEDICINE

## 2018-04-30 PROCEDURE — 87624 HPV HI-RISK TYP POOLED RSLT: CPT | Performed by: FAMILY MEDICINE

## 2018-04-30 PROCEDURE — G0145 SCR C/V CYTO,THINLAYER,RESCR: HCPCS | Performed by: FAMILY MEDICINE

## 2018-04-30 PROCEDURE — 36415 COLL VENOUS BLD VENIPUNCTURE: CPT | Performed by: FAMILY MEDICINE

## 2018-04-30 PROCEDURE — 80061 LIPID PANEL: CPT | Performed by: FAMILY MEDICINE

## 2018-04-30 RX ORDER — LISINOPRIL 10 MG/1
10 TABLET ORAL DAILY
Qty: 90 TABLET | Refills: 3 | Status: SHIPPED | OUTPATIENT
Start: 2018-04-30 | End: 2019-05-07

## 2018-04-30 RX ORDER — ACETAMINOPHEN AND CODEINE PHOSPHATE 120; 12 MG/5ML; MG/5ML
1 SOLUTION ORAL DAILY
Qty: 84 TABLET | Refills: 3 | Status: SHIPPED | OUTPATIENT
Start: 2018-04-30 | End: 2021-06-10

## 2018-04-30 RX ORDER — CHLORTHALIDONE 50 MG/1
50 TABLET ORAL DAILY
Qty: 90 TABLET | Refills: 3 | Status: SHIPPED | OUTPATIENT
Start: 2018-04-30 | End: 2019-05-07

## 2018-04-30 NOTE — LETTER
May 4, 2018    Sue Delgado  [address] [facility] [address]    Dear Sue,  We are happy to inform you that your PAP smear result from 4/30/18 is normal.  We are now able to do a follow up test on PAP smears. The DNA test is for HPV (Human Papilloma Virus). Cervical cancer is closely linked with certain types of HPV. Your results showed no evidence of high risk HPV.  Therefore we recommend you return in 5 years for your next pap smear and HPV test.  You will still need to return to the clinic every year for an annual exam and other preventive tests.  Please contact the clinic at [phone] with any questions.  Sincerely,    Radha Marcial MD/panchito

## 2018-04-30 NOTE — LETTER
My Depression Action Plan  Name: Sue Delgado   Date of Birth 1975  Date: 4/26/2018    My doctor: Radha Garnett   My clinic: [facility]  [id number] [place] [id number] [phone]          GREEN    ZONE   Good Control    What it looks like:     Things are going generally well. You have normal up s and down s. You may even feel depressed from time to time, but bad moods usually last less than a day.   What you need to do:  1. Continue to care for yourself (see self care plan)  2. Check your depression survival kit and update it as needed  3. Follow your physician s recommendations including any medication.  4. Do not stop taking medication unless you consult with your physician first.           YELLOW         ZONE Getting Worse    What it looks like:     Depression is starting to interfere with your life.     It may be hard to get out of bed; you may be starting to isolate yourself from others.    Symptoms of depression are starting to last most all day and this has happened for several days.     You may have suicidal thoughts but they are not constant.   What you need to do:     1. Call your care team, your response to treatment will improve if you keep your care team informed of your progress. Yellow periods are signs an adjustment may need to be made.     2. Continue your self-care, even if you have to fake it!    3. Talk to someone in your support network    4. Open up your depression survival kit           RED    ZONE Medical Alert - Get Help    What it looks like:     Depression is seriously interfering with your life.     You may experience these or other symptoms: You can t get out of bed most days, can t work or engage in other necessary activities, you have trouble taking care of basic hygiene, or basic responsibilities, thoughts of suicide or death that will not go away, self-injurious behavior.     What you need to do:  1. Call your care team and  request a same-day appointment. If they are not available (weekends or after hours) call your local crisis line, emergency room or 911.            Depression Self Care Plan / Survival Kit    Self-Care for Depression  Here s the deal. Your body and mind are really not as separate as most people think.  What you do and think affects how you feel and how you feel influences what you do and think. This means if you do things that people who feel good do, it will help you feel better.  Sometimes this is all it takes.  There is also a place for medication and therapy depending on how severe your depression is, so be sure to consult with your medical provider and/ or Behavioral Health Consultant if your symptoms are worsening or not improving.     In order to better manage my stress, I will:    Exercise  Get some form of exercise, every day. This will help reduce pain and release endorphins, the  feel good  chemicals in your brain. This is almost as good as taking antidepressants!  This is not the same as joining a gym and then never going! (they count on that by the way ) It can be as simple as just going for a walk or doing some gardening, anything that will get you moving.      Hygiene   Maintain good hygiene (Get out of bed in the morning, Make your bed, Brush your teeth, Take a shower, and Get dressed like you were going to work, even if you are unemployed).  If your clothes don't fit try to get ones that do.    Diet  I will strive to eat foods that are good for me, drink plenty of water, and avoid excessive sugar, caffeine, alcohol, and other mood-altering substances.  Some foods that are helpful in depression are: complex carbohydrates, B vitamins, flaxseed, fish or fish oil, fresh fruits and vegetables.    Psychotherapy  I agree to participate in Individual Therapy (if recommended).    Medication  If prescribed medications, I agree to take them.  Missing doses can result in serious side effects.  I understand that  drinking alcohol, or other illicit drug use, may cause potential side effects.  I will not stop my medication abruptly without first discussing it with my provider.    Staying Connected With Others  I will stay in touch with my friends, family members, and my primary care provider/team.    Use your imagination  Be creative.  We all have a creative side; it doesn t matter if it s oil painting, sand castles, or mud pies! This will also kick up the endorphins.    Witness Beauty  (AKA stop and smell the roses) Take a look outside, even in mid-winter. Notice colors, textures. Watch the squirrels and birds.     Service to others  Be of service to others.  There is always someone else in need.  By helping others we can  get out of ourselves  and remember the really important things.  This also provides opportunities for practicing all the other parts of the program.    Humor  Laugh and be silly!  Adjust your TV habits for less news and crime-drama and more comedy.    Control your stress  Try breathing deep, massage therapy, biofeedback, and meditation. Find time to relax each day.     My support system    Clinic Contact:  Phone number:    Contact 1:  Phone number:    Contact 2:  Phone number:    Faith/:  Phone number:    Therapist:  Phone number:    Local crisis center:    Phone number:    Other community support:  Phone number:

## 2018-04-30 NOTE — MR AVS SNAPSHOT
After Visit Summary   4/30/2018    Sue Delgado    MRN: [id number]           Patient Information     Date Of Birth          1975        Visit Information        Provider Department      4/30/2018 8:40 AM Radha Garnett MD [facility]        Today's Diagnoses     Routine general medical examination at a health care facility    -  1    Benign essential hypertension        Menorrhagia with regular cycle        CARDIOVASCULAR SCREENING; LDL GOAL LESS THAN 160        Encounter for screening mammogram for breast cancer        Screening for malignant neoplasm of cervix           Follow-ups after your visit        Future tests that were ordered for you today     Open Future Orders        Priority Expected Expires Ordered    MA Screening Digital Bilateral Routine  4/30/2019 4/30/2018            Who to contact     If you have questions or need follow up information about today's clinic visit or your schedule please contact [facility] directly at [phone].  Normal or non-critical lab and imaging results will be communicated to you by [employer], letter or phone within 4 business days after the clinic has received the results. If you do not hear from us within 7 days, please contact the clinic through [employer] or phone. If you have a critical or abnormal lab result, we will notify you by phone as soon as possible.  Submit refill requests through [employer] or call your pharmacy and they will forward the refill request to us. Please allow 3 business days for your refill to be completed.          Additional Information About Your Visit        MyChart Information     [employer] gives you secure access to your electronic health record. If you see a primary care provider, you can also send messages to your care team and make appointments. If you have questions, please call your primary care clinic.  If you do not have a primary care provider, please call [phone] and they will  assist you.        Care EveryWhere ID     This is your Care EveryWhere ID. This could be used by other organizations to access your [facility] medical records  [id number]        Your Vitals Were     Pulse Temperature Respirations Height Last Period BMI (Body Mass Index)    86 97.8  F (36.6  C) (Oral) 15 5' (1.524 m) 04/02/2018 26.56 kg/m2       Blood Pressure from Last 3 Encounters:   04/30/18 129/72   03/28/18 115/66   11/08/17 118/66    Weight from Last 3 Encounters:   04/30/18 136 lb (61.7 kg)   03/28/18 136 lb (61.7 kg)   11/08/17 140 lb (63.5 kg)              We Performed the Following     Basic metabolic panel     HPV High Risk Types DNA Cervical     Lipid panel reflex to direct LDL Fasting     Pap imaged thin layer screen with HPV - recommended age 30 - 65 years (select HPV order below)          Where to get your medicines      These medications were sent to [facility] [id number] [place] [address], [address]  [address] [facility], [facility] [address], [facility] [address]     Phone:  [phone]     chlorthalidone 50 MG tablet    lisinopril 10 MG tablet    norethindrone 0.35 MG per tablet          Primary Care Provider Office Phone # Fax #    Radha Garnett [phone] [address] [facility] [address]        Equal Access to Services     [facility] : Hadii jacob boucher hadsantiagoo Soian, waaxda luqadaha, qaybta kaalmada stephan, edita de jesus. So [facility] [phone].    ATENCIÓN: Si habla español, tiene a peoples disposición servicios gratuitos de asistencia lingüística. Llame al [phone].    We comply with applicable federal civil rights laws and [place] laws. We do not discriminate on the basis of race, color, national origin, age, disability, sex, sexual orientation, or gender identity.            Thank you!     Thank you for choosing [facility]  for your care. Our goal is always to provide you with excellent care. Hearing back from our  patients is one way we can continue to improve our services. Please take a few minutes to complete the written survey that you may receive in the mail after your visit with us. Thank you!             Your Updated Medication List - Protect others around you: Learn how to safely use, store and throw away your medicines at www.disposemymeds.org.          This list is accurate as of 4/30/18  9:18 AM.  Always use your most recent med list.                   Brand Name Dispense Instructions for use Diagnosis    chlorthalidone 50 MG tablet    HYGROTON    90 tablet    Take 1 tablet (50 mg) by mouth daily    Benign essential hypertension       lisinopril 10 MG tablet    PRINIVIL/ZESTRIL    90 tablet    Take 1 tablet (10 mg) by mouth daily    Benign essential hypertension       norethindrone 0.35 MG per tablet    MICRONOR    84 tablet    Take 1 tablet (0.35 mg) by mouth daily    Menorrhagia with regular cycle

## 2018-05-02 LAB
COPATH REPORT: NORMAL
PAP: NORMAL

## 2018-05-04 LAB
FINAL DIAGNOSIS: NORMAL
HPV HR 12 DNA CVX QL NAA+PROBE: NEGATIVE
HPV16 DNA SPEC QL NAA+PROBE: NEGATIVE
HPV18 DNA SPEC QL NAA+PROBE: NEGATIVE
SPECIMEN DESCRIPTION: NORMAL
SPECIMEN SOURCE CVX/VAG CYTO: NORMAL

## 2018-06-05 ENCOUNTER — OFFICE VISIT (OUTPATIENT)
Dept: ORTHOPEDICS | Facility: CLINIC | Age: 43
End: 2018-06-05
Payer: COMMERCIAL

## 2018-06-05 ENCOUNTER — RADIANT APPOINTMENT (OUTPATIENT)
Dept: GENERAL RADIOLOGY | Facility: CLINIC | Age: 43
End: 2018-06-05
Attending: FAMILY MEDICINE
Payer: COMMERCIAL

## 2018-06-05 ENCOUNTER — OFFICE VISIT (OUTPATIENT)
Dept: FAMILY MEDICINE | Facility: CLINIC | Age: 43
End: 2018-06-05
Payer: COMMERCIAL

## 2018-06-05 VITALS
WEIGHT: 136 LBS | OXYGEN SATURATION: 99 % | TEMPERATURE: 97.8 F | HEIGHT: 60 IN | HEART RATE: 80 BPM | SYSTOLIC BLOOD PRESSURE: 98 MMHG | BODY MASS INDEX: 26.7 KG/M2 | DIASTOLIC BLOOD PRESSURE: 59 MMHG

## 2018-06-05 VITALS
RESPIRATION RATE: 10 BRPM | DIASTOLIC BLOOD PRESSURE: 72 MMHG | SYSTOLIC BLOOD PRESSURE: 175 MMHG | OXYGEN SATURATION: 98 %

## 2018-06-05 DIAGNOSIS — M25.562 ACUTE PAIN OF LEFT KNEE: ICD-10-CM

## 2018-06-05 DIAGNOSIS — M25.562 ACUTE PAIN OF LEFT KNEE: Primary | ICD-10-CM

## 2018-06-05 DIAGNOSIS — M25.562 LEFT MEDIAL KNEE PAIN: Primary | ICD-10-CM

## 2018-06-05 PROCEDURE — 99213 OFFICE O/P EST LOW 20 MIN: CPT | Performed by: FAMILY MEDICINE

## 2018-06-05 PROCEDURE — 73560 X-RAY EXAM OF KNEE 1 OR 2: CPT | Mod: LT

## 2018-06-05 PROCEDURE — 99204 OFFICE O/P NEW MOD 45 MIN: CPT | Performed by: ORTHOPAEDIC SURGERY

## 2018-06-05 ASSESSMENT — PAIN SCALES - GENERAL: PAINLEVEL: SEVERE PAIN (6)

## 2018-06-05 NOTE — MR AVS SNAPSHOT
After Visit Summary   6/5/2018    Sue Delgado    MRN: [id number]           Patient Information     Date Of Birth          1975        Visit Information        Provider Department      6/5/2018 1:15 PM German Pruitt MD [facility]        Care Instructions          1.  Please call the [facility] imaging center at [phone] to schedule your test with one of their staff.    2. Make a follow up appointment ([phone]) with Dr. German Pruitt 3-5 days after your exam to discuss your results.  This gives the radiologist and Physician time to review the results.            Follow-ups after your visit        Who to contact     If you have questions or need follow up information about today's clinic visit or your schedule please contact [facility] directly at [phone].  Normal or non-critical lab and imaging results will be communicated to you by MyChart, letter or phone within 4 business days after the clinic has received the results. If you do not hear from us within 7 days, please contact the clinic through [employer] or phone. If you have a critical or abnormal lab result, we will notify you by phone as soon as possible.  Submit refill requests through [employer] or call your pharmacy and they will forward the refill request to us. Please allow 3 business days for your refill to be completed.          Additional Information About Your Visit        MyChart Information     [employer] gives you secure access to your electronic health record. If you see a primary care provider, you can also send messages to your care team and make appointments. If you have questions, please call your primary care clinic.  If you do not have a primary care provider, please call [phone] and they will assist you.        Care EveryWhere ID     This is your Care EveryWhere ID. This could be used by other organizations to access your [place] medical records  [id number]         Your Vitals Were     Respirations Pulse Oximetry                10 98%           Blood Pressure from Last 3 Encounters:   06/05/18 175/72   06/05/18 98/59   04/30/18 129/72    Weight from Last 3 Encounters:   06/05/18 61.7 kg (136 lb)   04/30/18 61.7 kg (136 lb)   03/28/18 61.7 kg (136 lb)              Today, you had the following     No orders found for display       Primary Care Provider Office Phone # Fax #    Radha Garnett [phone] [address] [address] [facility] [address]        Equal Access to Services     [facility]: Hadii jacob boucher hadasho Soian, waaxda luqadaha, qaybta kaalmada adelillieyada, edita benson . So [facility] [phone].    ATENCIÓN: Si habla español, tiene a peoples disposición servicios gratuitos de asistencia lingüística. Llame al [phone].    We comply with applicable federal civil rights laws and [place] laws. We do not discriminate on the basis of race, color, national origin, age, disability, sex, sexual orientation, or gender identity.            Thank you!     Thank you for choosing [facility]  for your care. Our goal is always to provide you with excellent care. Hearing back from our patients is one way we can continue to improve our services. Please take a few minutes to complete the written survey that you may receive in the mail after your visit with us. Thank you!             Your Updated Medication List - Protect others around you: Learn how to safely use, store and throw away your medicines at www.disposemymeds.org.          This list is accurate as of 6/5/18  1:19 PM.  Always use your most recent med list.                   Brand Name Dispense Instructions for use Diagnosis    chlorthalidone 50 MG tablet    HYGROTON    90 tablet    Take 1 tablet (50 mg) by mouth daily    Benign essential hypertension       lisinopril 10 MG tablet    PRINIVIL/ZESTRIL    90 tablet    Take 1 tablet (10 mg) by mouth daily    Benign  essential hypertension       norethindrone 0.35 MG per tablet    MICRONOR    84 tablet    Take 1 tablet (0.35 mg) by mouth daily    Menorrhagia with regular cycle

## 2018-06-05 NOTE — LETTER
"    6/5/2018         RE: Sue Delgado  [address] [facility] [address]        Dear Colleague,    Thank you for referring your patient, Sue Delgado, to the [facility]. Please see a copy of my visit note below.    SUBJECTIVE:   Sue Delgado is a 43 year old female who is seen in consultation at the request of Dr. Kofi Coyne for evaluation of left knee pain that has been present for 1 day. Last night, the patient was jumping up onto her bed and felt a sudden pain in her left knee. She felt a \"pop\" and the knee became immediately painful. Since then, she has noticed swelling and been ambulating with crutches.    Present symptoms: anterolateral knee pain and swelling   Symptoms occur when: flexing the knee and weightbearing    Treatments tried to this point: knee brace which has helped somewhat and NSAIDs    Orthopedic PMH: No prior history of issues with the left knee    Review of Systems:  Constitutional:  NEGATIVE for fever, chills, change in weight  Integumentary/Skin:  NEGATIVE for worrisome rashes, moles or lesions  Eyes:  NEGATIVE for vision changes or irritation  ENT/Mouth:  NEGATIVE for ear, mouth and throat problems  Resp:  NEGATIVE for significant cough or SOB  Breast:  NEGATIVE for masses, tenderness or discharge  CV:  NEGATIVE for chest pain, palpitations or peripheral edema  GI:  NEGATIVE for nausea, abdominal pain, heartburn, or change in bowel habits  :  Negative   Musculoskeletal:  See HPI above  Neuro:  NEGATIVE for weakness, dizziness or paresthesias  Endocrine:  NEGATIVE for temperature intolerance, skin/hair changes  Heme/allergy/immune:  NEGATIVE for bleeding problems  Psychiatric:  NEGATIVE for changes in mood or affect    Past Medical History:   Past Medical History:   Diagnosis Date     NO ACTIVE PROBLEMS      Past Surgical History:   Past Surgical History:   Procedure Laterality Date     NO HISTORY OF SURGERY       Family History: "   Family History   Problem Relation Age of Onset     DIABETES Father      DIABETES Paternal Grandmother      DIABETES Paternal Grandfather      Social History:   Social History   Substance Use Topics     Smoking status: Never Smoker     Smokeless tobacco: Never Used     Alcohol use Yes     OBJECTIVE:  Physical Exam:  /72 (BP Location: Left arm, Patient Position: Sitting, Cuff Size: Adult Regular)  Resp 10  SpO2 98%  General Appearance: healthy, alert and no distress   Skin: no suspicious lesions or rashes  Neuro: Normal strength and tone, mentation intact and speech normal  Vascular: good pulses, and cappillary refill   Lymph: no lymphadenopathy   Psych:  mentation appears normal and affect normal/bright  Resp: no increased work of breathing     Left Knee Exam:  Gait: Unable to bear weight on the left side  Squat: Not tested  Effusion: mild  ROM: -3-130* degrees, also has pain with mid-range ROM  Tender: Some tenderness anterolaterally  Ligaments: Lachman's Stable, Anterior and posterior drawer stable, medial and lateral colligaments. No pain with Varus/Valgus stress testing.    Ray's: positive laterally  Patellar apprehension test: negative    X-rays:  Obtained today, 06/05/18, of the LEFT KNEE: 1/2 views, reviewed in the office with the patient by myself today and are essentially normal.     ASSESSMENT:   Possible lateral meniscus tear, left knee    PLAN:   We talked about the options. MRI ordered. I informed the patient that if she would like to wait and monitor her symptoms for 2 weeks before proceeding with an MRI, that would be okay as well. In the meantime, icing, wrapping, and NSAIDs as needed. Weightbearing as tolerated. All questions were answered.    Return to clinic: for MRI results    CORRIE Pruitt MD  Dept. Orthopedic Surgery  [facility]     This document serves as a record of the services and decisions personally performed and made by Dr. CORRIE Pruitt MD. It was created  on his behalf by Gerard Gar, a trained medical scribe. The creation of this record is based on the provider's personal observations and the statements of the patient. This document has been checked and approved by the attending provider.   Gerard Gar June 5, 2018 1:54 PM     Again, thank you for allowing me to participate in the care of your patient.        Sincerely,        German Pruitt MD

## 2018-06-05 NOTE — PROGRESS NOTES
CHIEF COMPLAINT    Left knee injury.      HISTORY    Last night, Janna was simply jumping up onto her bed which is quite high.  She felt sudden pain in her left knee and actually felt a pop when this occurred.  The knee then became painful and has remained so.  Location of pain is the anterolateral aspect of left knee.  She has not noticed a lot of swelling.  Her boyfriend bought her a brace which has helped somewhat.  Still difficult to bear weight and ambulate however.    No previous knee problems.    Patient Active Problem List   Diagnosis     CARDIOVASCULAR SCREENING; LDL GOAL LESS THAN 160     Anemia     Other iron deficiency anemia     Benign essential hypertension     Current Outpatient Prescriptions   Medication Sig Dispense Refill     chlorthalidone (HYGROTON) 50 MG tablet Take 1 tablet (50 mg) by mouth daily 90 tablet 3     lisinopril (PRINIVIL/ZESTRIL) 10 MG tablet Take 1 tablet (10 mg) by mouth daily 90 tablet 3     norethindrone (MICRONOR) 0.35 MG per tablet Take 1 tablet (0.35 mg) by mouth daily 84 tablet 3       REVIEW OF SYSTEMS    Unremarkable other than above.      Past Medical History:   Diagnosis Date     NO ACTIVE PROBLEMS        EXAM  BP 98/59  Pulse 80  Temp 97.8  F (36.6  C) (Oral)  Ht 5' (1.524 m)  Wt 136 lb (61.7 kg)  SpO2 99%  BMI 26.56 kg/m2    She appears well in general.  Left knee shows no effusion.  She has pain with flexion.  There is tenderness along the anterior portion of the lateral joint line.  Negative patella apprehension sign.  No medial tenderness.    X-ray is unremarkable.      (M25.562) Acute pain of left knee  (primary encounter diagnosis)  Comment:     Concern for possible internal derangement such as tear of lateral meniscus.  It is possible that this is just a sprain given the lack of the fusion.  I would like to obtain orthopedic consultation.  We will have her crutch walk.  She is agreeable with this plan.    Plan: XR Knee Left 1/2 Views, ORTHOPEDICS ADULT          REFERRAL            Crutch walk.    Partial weight on L leg.    See Orthopedic Doctor.

## 2018-06-05 NOTE — MR AVS SNAPSHOT
After Visit Summary   6/5/2018    Sue Delgado    MRN: [id number]           Patient Information     Date Of Birth          1975        Visit Information        Provider Department      6/5/2018 9:20 AM Kofi Coyne MD [facility]        Today's Diagnoses     Acute pain of left knee    -  1      Care Instructions      Crutch walk.    Partial weight on L leg.    See Orthopedic Doctor.          Follow-ups after your visit        Additional Services     ORTHOPEDICS ADULT REFERRAL       Your provider has referred you to: [facility]: [facility] [phone]   [email or web address]    Please be aware that coverage of these services is subject to the terms and limitations of your health insurance plan.  Call member services at your health plan with any benefit or coverage questions.      Please bring the following to your appointment:    >>   Any x-rays, CTs or MRIs which have been performed.  Contact the facility where they were done to arrange for  prior to your scheduled appointment.    >>   List of current medications   >>   This referral request   >>   Any documents/labs given to you for this referral                  Who to contact     If you have questions or need follow up information about today's clinic visit or your schedule please contact [facility] directly at [phone].  Normal or non-critical lab and imaging results will be communicated to you by MyChart, letter or phone within 4 business days after the clinic has received the results. If you do not hear from us within 7 days, please contact the clinic through MyChart or phone. If you have a critical or abnormal lab result, we will notify you by phone as soon as possible.  Submit refill requests through [facility] or call your pharmacy and they will forward the refill request to us. Please allow 3 business days for your refill to be completed.           Additional Information About Your Visit        MyChart Information     [facility] gives you secure access to your electronic health record. If you see a primary care provider, you can also send messages to your care team and make appointments. If you have questions, please call your primary care clinic.  If you do not have a primary care provider, please call [phone] and they will assist you.        Care EveryWhere ID     This is your Care EveryWhere ID. This could be used by other organizations to access your [place] medical records  [id number]        Your Vitals Were     Pulse Temperature Height Pulse Oximetry BMI (Body Mass Index)       80 97.8  F (36.6  C) (Oral) 5' (1.524 m) 99% 26.56 kg/m2        Blood Pressure from Last 3 Encounters:   06/05/18 98/59   04/30/18 129/72   03/28/18 115/66    Weight from Last 3 Encounters:   06/05/18 136 lb (61.7 kg)   04/30/18 136 lb (61.7 kg)   03/28/18 136 lb (61.7 kg)              We Performed the Following     Aluminum Crutches Adult     ORTHOPEDICS ADULT REFERRAL        Primary Care Provider Office Phone # Fax #    Radha Garnett [phone] [id number] [facility] [address]        Equal Access to Services     MARCELLE CUEVAS : Hadii aad ku hadasho Soomaali, waaxda luqadaha, qaybta kaalmada adeegyada, waxay idiin hayanan sonja benson . So [facility] [place] [phone].    ATENCIÓN: Si habla español, tiene a peoples disposición servicios gratuitos de asistencia lingüística. Llame al [phone].    We comply with applicable federal civil rights laws and [place] laws. We do not discriminate on the basis of race, color, national origin, age, disability, sex, sexual orientation, or gender identity.            Thank you!     Thank you for choosing [facility]  for your care. Our goal is always to provide you with excellent care. Hearing back from our patients is one way we can continue to improve our services. Please take a few minutes to  complete the written survey that you may receive in the mail after your visit with us. Thank you!             Your Updated Medication List - Protect others around you: Learn how to safely use, store and throw away your medicines at www.disposemymeds.org.          This list is accurate as of 6/5/18  9:57 AM.  Always use your most recent med list.                   Brand Name Dispense Instructions for use Diagnosis    chlorthalidone 50 MG tablet    HYGROTON    90 tablet    Take 1 tablet (50 mg) by mouth daily    Benign essential hypertension       lisinopril 10 MG tablet    PRINIVIL/ZESTRIL    90 tablet    Take 1 tablet (10 mg) by mouth daily    Benign essential hypertension       norethindrone 0.35 MG per tablet    MICRONOR    84 tablet    Take 1 tablet (0.35 mg) by mouth daily    Menorrhagia with regular cycle

## 2018-06-05 NOTE — PATIENT INSTRUCTIONS
1.  Please call the [place] imaging center at [phone] to schedule your test with one of their staff.    2. Make a follow up appointment ([phone]) with Dr. German Pruitt 3-5 days after your exam to discuss your results.  This gives the radiologist and Physician time to review the results.     Normal for race

## 2018-06-05 NOTE — PROGRESS NOTES
"SUBJECTIVE:   Sue Delgado is a 43 year old female who is seen in consultation at the request of Dr. Kofi Coyne for evaluation of left knee pain that has been present for 1 day. Last night, the patient was jumping up onto her bed and felt a sudden pain in her left knee. She felt a \"pop\" and the knee became immediately painful. Since then, she has noticed swelling and been ambulating with crutches.    Present symptoms: anterolateral knee pain and swelling   Symptoms occur when: flexing the knee and weightbearing    Treatments tried to this point: knee brace which has helped somewhat and NSAIDs    Orthopedic PMH: No prior history of issues with the left knee    Review of Systems:  Constitutional:  NEGATIVE for fever, chills, change in weight  Integumentary/Skin:  NEGATIVE for worrisome rashes, moles or lesions  Eyes:  NEGATIVE for vision changes or irritation  ENT/Mouth:  NEGATIVE for ear, mouth and throat problems  Resp:  NEGATIVE for significant cough or SOB  Breast:  NEGATIVE for masses, tenderness or discharge  CV:  NEGATIVE for chest pain, palpitations or peripheral edema  GI:  NEGATIVE for nausea, abdominal pain, heartburn, or change in bowel habits  :  Negative   Musculoskeletal:  See HPI above  Neuro:  NEGATIVE for weakness, dizziness or paresthesias  Endocrine:  NEGATIVE for temperature intolerance, skin/hair changes  Heme/allergy/immune:  NEGATIVE for bleeding problems  Psychiatric:  NEGATIVE for changes in mood or affect    Past Medical History:   Past Medical History:   Diagnosis Date     NO ACTIVE PROBLEMS      Past Surgical History:   Past Surgical History:   Procedure Laterality Date     NO HISTORY OF SURGERY       Family History:   Family History   Problem Relation Age of Onset     DIABETES Father      DIABETES Paternal Grandmother      DIABETES Paternal Grandfather      Social History:   Social History   Substance Use Topics     Smoking status: Never Smoker     Smokeless tobacco: Never " Used     Alcohol use Yes     OBJECTIVE:  Physical Exam:  /72 (BP Location: Left arm, Patient Position: Sitting, Cuff Size: Adult Regular)  Resp 10  SpO2 98%  General Appearance: healthy, alert and no distress   Skin: no suspicious lesions or rashes  Neuro: Normal strength and tone, mentation intact and speech normal  Vascular: good pulses, and cappillary refill   Lymph: no lymphadenopathy   Psych:  mentation appears normal and affect normal/bright  Resp: no increased work of breathing     Left Knee Exam:  Gait: Unable to bear weight on the left side  Squat: Not tested  Effusion: mild  ROM: -3-130* degrees, also has pain with mid-range ROM  Tender: Some tenderness anterolaterally  Ligaments: Lachman's Stable, Anterior and posterior drawer stable, medial and lateral colligaments. No pain with Varus/Valgus stress testing.    Ray's: positive laterally  Patellar apprehension test: negative    X-rays:  Obtained today, 06/05/18, of the LEFT KNEE: 1/2 views, reviewed in the office with the patient by myself today and are essentially normal.     ASSESSMENT:   Possible lateral meniscus tear, left knee    PLAN:   We talked about the options. MRI ordered. I informed the patient that if she would like to wait and monitor her symptoms for 2 weeks before proceeding with an MRI, that would be okay as well. In the meantime, icing, wrapping, and NSAIDs as needed. Weightbearing as tolerated. All questions were answered.    Return to clinic: for MRI results    CORRIE Pruitt MD  Dept. Orthopedic Surgery  [facility] [place]     This document serves as a record of the services and decisions personally performed and made by Dr. CORRIE Pruitt MD. It was created on his behalf by Gerard Gar, a trained medical scribe. The creation of this record is based on the provider's personal observations and the statements of the patient. This document has been checked and approved by the attending provider.   Gerard Agarwal  5, 2018 1:54 PM

## 2018-06-08 ENCOUNTER — RADIANT APPOINTMENT (OUTPATIENT)
Dept: MRI IMAGING | Facility: CLINIC | Age: 43
End: 2018-06-08
Attending: ORTHOPAEDIC SURGERY
Payer: COMMERCIAL

## 2018-06-08 DIAGNOSIS — M25.562 LEFT MEDIAL KNEE PAIN: ICD-10-CM

## 2018-06-08 PROCEDURE — 73721 MRI JNT OF LWR EXTRE W/O DYE: CPT | Mod: TC

## 2018-06-12 ENCOUNTER — OFFICE VISIT (OUTPATIENT)
Dept: ORTHOPEDICS | Facility: CLINIC | Age: 43
End: 2018-06-12
Payer: COMMERCIAL

## 2018-06-12 VITALS
DIASTOLIC BLOOD PRESSURE: 88 MMHG | BODY MASS INDEX: 26.56 KG/M2 | HEART RATE: 93 BPM | WEIGHT: 136 LBS | SYSTOLIC BLOOD PRESSURE: 132 MMHG | OXYGEN SATURATION: 98 %

## 2018-06-12 DIAGNOSIS — S83.242D OTHER TEAR OF MEDIAL MENISCUS OF LEFT KNEE AS CURRENT INJURY, SUBSEQUENT ENCOUNTER: Primary | ICD-10-CM

## 2018-06-12 PROCEDURE — 99213 OFFICE O/P EST LOW 20 MIN: CPT | Performed by: ORTHOPAEDIC SURGERY

## 2018-06-12 ASSESSMENT — PAIN SCALES - GENERAL: PAINLEVEL: NO PAIN (0)

## 2018-06-12 NOTE — PROGRESS NOTES
SUBJECTIVE:  Sue Delgado returns for MRI results from 06/08/18 of the LEFT KNEE, which are reviewed with the patient by myself and showed:  1. Absent posterior meniscal root ligament of the medial meniscus consistent with an avulsion/tear of uncertain age.  2. Small joint space effusion.    The patient reports her knee has been feeling a lot better since last visit.    Review of Systems:  Constitutional/General: Negative for fever, chills, change in weight  Integumentary/Skin: Negative for worrisome rashes, moles, or lesions  Neuro: Negative for weakness, dizziness, or paresthesias   Psychiatric: negative for changes in mood or affect    OBJECTIVE:  Physical Exam:  /88 (BP Location: Right arm, Patient Position: Sitting, Cuff Size: Adult Regular)  Pulse 93  Wt 61.7 kg (136 lb)  SpO2 98%  BMI 26.56 kg/m2  General Appearance: healthy, alert and no distress   Skin: no suspicious lesions or rashes  Neuro: Normal strength and tone, mentation intact and speech normal  Vascular: good pulses, and cappillary refill   Lymph: no lymphadenopathy   Psych:  mentation appears normal and affect normal/bright  Resp: no increased work of breathing    Left Knee Exam:  Squat: Full squat with minimal pain  Effusion: minimal   ROM: full  Tender: No significant tenderness  Ligaments: Lachman's stable, anterior and posterior drawer stable, and stable to varus and valgus stress  Ray's: negative    ASSESSMENT:   Posterior meniscal root tear of medial meniscus, left knee    PLAN:  We talked about the options. Because she is not particularly symptomatic today, I didn't recommend surgery. If pain returns, I would recommend that the patient have knee arthroscopy and repair the torn medial meniscal root. In the meantime, physical activities as tolerated.  We discussed the possible onset of osteoarthritis.  But with the somewhat unpredictable results with meniscal root repair, I don't want to recommend surgery in a  symptom-free knee.    Return to clinic: PRN    Total time spent was 15 minutes; with 15 minutes spent face-to-face with patient dedicated to education, counseling and a development of a treatment plan.    CORRIE Pruitt MD  Dept. Orthopedic Surgery  [facility]    This document serves as a record of the services and decisions personally performed and made by Dr. CORRIE Pruitt MD. It was created on his behalf by Gerard Gar, a trained medical scribe. The creation of this record is based on the provider's personal observations and the statements of the patient. This document has been checked and approved by the attending provider.   Gerard Gar June 12, 2018 9:21 AM

## 2018-06-12 NOTE — LETTER
6/12/2018         RE: Sue Delgado  [address] [facility] [address]        Dear Colleague,    Thank you for referring your patient, Sue Delgado, to the [facility]. Please see a copy of my visit note below.    SUBJECTIVE:  Sue Delgado returns for MRI results from 06/08/18 of the LEFT KNEE, which are reviewed with the patient by myself and showed:  1. Absent posterior meniscal root ligament of the medial meniscus consistent with an avulsion/tear of uncertain age.  2. Small joint space effusion.    The patient reports her knee has been feeling a lot better since last visit.    Review of Systems:  Constitutional/General: Negative for fever, chills, change in weight  Integumentary/Skin: Negative for worrisome rashes, moles, or lesions  Neuro: Negative for weakness, dizziness, or paresthesias   Psychiatric: negative for changes in mood or affect    OBJECTIVE:  Physical Exam:  /88 (BP Location: Right arm, Patient Position: Sitting, Cuff Size: Adult Regular)  Pulse 93  Wt 61.7 kg (136 lb)  SpO2 98%  BMI 26.56 kg/m2  General Appearance: healthy, alert and no distress   Skin: no suspicious lesions or rashes  Neuro: Normal strength and tone, mentation intact and speech normal  Vascular: good pulses, and cappillary refill   Lymph: no lymphadenopathy   Psych:  mentation appears normal and affect normal/bright  Resp: no increased work of breathing    Left Knee Exam:  Squat: Full squat with minimal pain  Effusion: minimal   ROM: full  Tender: No significant tenderness  Ligaments: Lachman's stable, anterior and posterior drawer stable, and stable to varus and valgus stress  Ray's: negative    ASSESSMENT:   Posterior meniscal root tear of medial meniscus, left knee    PLAN:  We talked about the options. Because she is not particularly symptomatic today, I didn't recommend surgery. If pain returns, I would recommend that the patient have knee arthroscopy  and repair the torn medial meniscal root. In the meantime, physical activities as tolerated.  We discussed the possible onset of osteoarthritis.  But with the somewhat unpredictable results with meniscal root repair, I don't want to recommend surgery in a symptom-free knee.    Return to clinic: PRN    Total time spent was 15 minutes; with 15 minutes spent face-to-face with patient dedicated to education, counseling and a development of a treatment plan.    CORRIE Pruitt MD  Dept. Orthopedic Surgery  [facility]    This document serves as a record of the services and decisions personally performed and made by Dr. CORRIE Pruitt MD. It was created on his behalf by Gerard Gar, a trained medical scribe. The creation of this record is based on the provider's personal observations and the statements of the patient. This document has been checked and approved by the attending provider.   Gerard Gar June 12, 2018 9:21 AM    Again, thank you for allowing me to participate in the care of your patient.        Sincerely,        German Pruitt MD

## 2018-06-12 NOTE — PATIENT INSTRUCTIONS
The MRI showed a meniscal root tear. If you are not having pain we don't recommend surgical intervention. If you do start having pain, discussion regarding meniscal root repair should happen.

## 2018-12-06 NOTE — MR AVS SNAPSHOT
After Visit Summary   6/12/2018    Sue Delgado    MRN: [id number]           Patient Information     Date Of Birth          1975        Visit Information        Provider Department      6/12/2018 8:30 AM German Pruitt MD [facility]         Follow-ups after your visit        Who to contact     If you have questions or need follow up information about today's clinic visit or your schedule please contact [facility] directly at [phone].  Normal or non-critical lab and imaging results will be communicated to you by MyChart, letter or phone within 4 business days after the clinic has received the results. If you do not hear from us within 7 days, please contact the clinic through HeyLetshart or phone. If you have a critical or abnormal lab result, we will notify you by phone as soon as possible.  Submit refill requests through Medxnote or call your pharmacy and they will forward the refill request to us. Please allow 3 business days for your refill to be completed.          Additional Information About Your Visit        MyChart Information     Medxnote gives you secure access to your electronic health record. If you see a primary care provider, you can also send messages to your care team and make appointments. If you have questions, please call your primary care clinic.  If you do not have a primary care provider, please call [phone] and they will assist you.        Care EveryWhere ID     This is your Care EveryWhere ID. This could be used by other organizations to access your [place] medical records  [id number]        Your Vitals Were     Pulse Pulse Oximetry BMI (Body Mass Index)             93 98% 26.56 kg/m2          Blood Pressure from Last 3 Encounters:   06/12/18 132/88   06/05/18 175/72   06/05/18 98/59    Weight from Last 3 Encounters:   06/12/18 61.7 kg (136 lb)   06/05/18 61.7 kg (136 lb)   04/30/18 61.7 kg (136 lb)              Today,  Telephone Encounter by Naeem Ray MD at 11/16/17 07:36 AM     Author:  Naeem Ray MD Service:  (none) Author Type:  Physician     Filed:  11/16/17 07:36 AM Encounter Date:  11/11/2017 Status:  Signed     :  Naeem Ray MD (Physician)            Is there any reason we wouldn't refill his metoprolol??[AB1.1M]      Revision History        User Key Date/Time User Provider Type Action    > AB1.1 11/16/17 07:36 AM Naeem Ray MD Physician Sign    M - Manual             you had the following     No orders found for display       Primary Care Provider Office Phone # Fax #    Radha Garnett [phone] [address] [facility] [address]        Equal Access to Services     MARCELLE CUEVAS : Hadii jacob ku hadsantiagoo Soomaali, waaxda luqadaha, qaybta kaalmada adeegyada, edita matthewsn adelillie azar marcelino de jesus. So [facility] [phone].    ATENCIÓN: Si habla español, tiene a peoples disposición servicios gratuitos de asistencia lingüística. Llame al [phone].    We comply with applicable federal civil rights laws and [place] laws. We do not discriminate on the basis of race, color, national origin, age, disability, sex, sexual orientation, or gender identity.            Thank you!     Thank you for choosing [facility]  [facility] your [facility]. Our goal is always to provide you with excellent care. Hearing back from our patients is one way we can continue to improve our services. Please take a few minutes to complete the written survey that you may receive in the mail after your visit with us. Thank you!             Your Updated Medication List - Protect others around you: Learn how to safely use, store and throw away your medicines at www.disposemymeds.org.          This list is accurate as of 6/12/18  9:21 AM.  Always use your most recent med list.                   Brand Name Dispense Instructions for use Diagnosis    chlorthalidone 50 MG tablet    HYGROTON    90 tablet    Take 1 tablet (50 mg) by mouth daily    Benign essential hypertension       lisinopril 10 MG tablet    PRINIVIL/ZESTRIL    90 tablet    Take 1 tablet (10 mg) by mouth daily    Benign essential hypertension       norethindrone 0.35 MG per tablet    MICRONOR    84 tablet    Take 1 tablet (0.35 mg) by mouth daily    Menorrhagia with regular cycle

## 2019-05-07 ENCOUNTER — TELEPHONE (OUTPATIENT)
Dept: FAMILY MEDICINE | Facility: CLINIC | Age: 44
End: 2019-05-07

## 2019-05-07 DIAGNOSIS — I10 BENIGN ESSENTIAL HYPERTENSION: ICD-10-CM

## 2019-05-07 RX ORDER — LISINOPRIL 10 MG/1
10 TABLET ORAL DAILY
Qty: 90 TABLET | Refills: 0 | Status: SHIPPED | OUTPATIENT
Start: 2019-05-07 | End: 2019-05-15

## 2019-05-07 RX ORDER — CHLORTHALIDONE 50 MG/1
50 TABLET ORAL DAILY
Qty: 90 TABLET | Refills: 0 | Status: SHIPPED | OUTPATIENT
Start: 2019-05-07 | End: 2019-05-15

## 2019-05-07 NOTE — TELEPHONE ENCOUNTER
Reason for Call:  Medication or medication refill:    Do you use a [facility]?  Name of the pharmacy and phone number for the current request:  [facility] [phone]    Name of the medication requested: lisinopril (PRINIVIL/ZESTRIL) 10 MG tablet and HTCZ    Other request: patient is out of her medication after tomorrow, she has a follow up appointment on 5/15/2019 at 1:40pm    Can we leave a detailed message on this number? YES    Phone number patient can be reached at: Home number on file [phone] (home)    Best Time: any    Call taken on 5/7/2019 at 12:49 PM by Hortencia Rahman

## 2019-05-14 NOTE — PROGRESS NOTES
SUBJECTIVE:   Sue Delgado is a 44 year old female who presents to clinic today for the following   health issues:        Hypertension Follow-up      Outpatient blood pressures are not being checked.    Low Salt Diet: not monitoring salt      Amount of exercise or physical activity: None    Problems taking medications regularly: No    Medication side effects: none    Diet: regular (no restrictions)    Pt with HTN well controlled on medicaiton  Needs refill and labwork    Due for tdap. No records of previous one      Additional history: as documented    Reviewed  and updated as needed this visit by clinical staff         Reviewed and updated as needed this visit by Provider         Labs reviewed in EPIC    ROS:  Constitutional, HEENT, cardiovascular, pulmonary, gi and gu systems are negative, except as otherwise noted.    OBJECTIVE:     /69   Pulse 94   Temp 98.4  F (36.9  C) (Oral)   Ht 1.524 m (5')   Wt 62.1 kg (137 lb)   BMI 26.76 kg/m    Body mass index is 26.76 kg/m .  GENERAL: healthy, alert and no distress  NECK: no adenopathy, no asymmetry, masses, or scars and thyroid normal to palpation  RESP: lungs clear to auscultation - no rales, rhonchi or wheezes  CV: regular rate and rhythm, normal S1 S2, no S3 or S4, no murmur, click or rub, no peripheral edema and peripheral pulses strong  ABDOMEN: soft, nontender, no hepatosplenomegaly, no masses and bowel sounds normal  MS: no gross musculoskeletal defects noted, no edema    Diagnostic Test Results:  none     ASSESSMENT/PLAN:     1. Benign essential hypertension  At goal on meds  - chlorthalidone (HYGROTON) 50 MG tablet; Take 1 tablet (50 mg) by mouth daily  Dispense: 90 tablet; Refill: 3  - lisinopril (PRINIVIL/ZESTRIL) 10 MG tablet; Take 1 tablet (10 mg) by mouth daily  Dispense: 90 tablet; Refill: 3  - Basic metabolic panel    2. Need for Tdap vaccination  given  - TDAP, IM (10 - 64 YRS) - Juan Marcial MD  [facility]

## 2019-05-15 ENCOUNTER — OFFICE VISIT (OUTPATIENT)
Dept: FAMILY MEDICINE | Facility: CLINIC | Age: 44
End: 2019-05-15
Payer: COMMERCIAL

## 2019-05-15 VITALS
DIASTOLIC BLOOD PRESSURE: 69 MMHG | WEIGHT: 137 LBS | HEART RATE: 94 BPM | SYSTOLIC BLOOD PRESSURE: 106 MMHG | HEIGHT: 60 IN | BODY MASS INDEX: 26.9 KG/M2 | TEMPERATURE: 98.4 F

## 2019-05-15 DIAGNOSIS — E87.6 LOW SERUM POTASSIUM: ICD-10-CM

## 2019-05-15 DIAGNOSIS — I10 BENIGN ESSENTIAL HYPERTENSION: Primary | ICD-10-CM

## 2019-05-15 DIAGNOSIS — Z23 NEED FOR TDAP VACCINATION: ICD-10-CM

## 2019-05-15 LAB
ANION GAP SERPL CALCULATED.3IONS-SCNC: 5 MMOL/L (ref 3–14)
BUN SERPL-MCNC: 16 MG/DL (ref 7–30)
CALCIUM SERPL-MCNC: 9 MG/DL (ref 8.5–10.1)
CHLORIDE SERPL-SCNC: 103 MMOL/L (ref 94–109)
CO2 SERPL-SCNC: 30 MMOL/L (ref 20–32)
CREAT SERPL-MCNC: 0.62 MG/DL (ref 0.52–1.04)
GFR SERPL CREATININE-BSD FRML MDRD: >90 ML/MIN/{1.73_M2}
GLUCOSE SERPL-MCNC: 89 MG/DL (ref 70–99)
POTASSIUM SERPL-SCNC: 2.9 MMOL/L (ref 3.4–5.3)
SODIUM SERPL-SCNC: 138 MMOL/L (ref 133–144)

## 2019-05-15 PROCEDURE — 36415 COLL VENOUS BLD VENIPUNCTURE: CPT | Performed by: FAMILY MEDICINE

## 2019-05-15 PROCEDURE — 99213 OFFICE O/P EST LOW 20 MIN: CPT | Mod: 25 | Performed by: FAMILY MEDICINE

## 2019-05-15 PROCEDURE — 90715 TDAP VACCINE 7 YRS/> IM: CPT | Performed by: FAMILY MEDICINE

## 2019-05-15 PROCEDURE — 80048 BASIC METABOLIC PNL TOTAL CA: CPT | Performed by: FAMILY MEDICINE

## 2019-05-15 PROCEDURE — 90471 IMMUNIZATION ADMIN: CPT | Performed by: FAMILY MEDICINE

## 2019-05-15 RX ORDER — LISINOPRIL 10 MG/1
10 TABLET ORAL DAILY
Qty: 90 TABLET | Refills: 3 | Status: SHIPPED | OUTPATIENT
Start: 2019-05-15 | End: 2020-07-22

## 2019-05-15 RX ORDER — CHLORTHALIDONE 50 MG/1
50 TABLET ORAL DAILY
Qty: 90 TABLET | Refills: 3 | Status: SHIPPED | OUTPATIENT
Start: 2019-05-15 | End: 2020-07-22

## 2019-05-15 ASSESSMENT — ANXIETY QUESTIONNAIRES
5. BEING SO RESTLESS THAT IT IS HARD TO SIT STILL: NOT AT ALL
1. FEELING NERVOUS, ANXIOUS, OR ON EDGE: NOT AT ALL
7. FEELING AFRAID AS IF SOMETHING AWFUL MIGHT HAPPEN: NOT AT ALL
3. WORRYING TOO MUCH ABOUT DIFFERENT THINGS: NOT AT ALL
6. BECOMING EASILY ANNOYED OR IRRITABLE: NOT AT ALL
IF YOU CHECKED OFF ANY PROBLEMS ON THIS QUESTIONNAIRE, HOW DIFFICULT HAVE THESE PROBLEMS MADE IT FOR YOU TO DO YOUR WORK, TAKE CARE OF THINGS AT HOME, OR GET ALONG WITH OTHER PEOPLE: NOT DIFFICULT AT ALL
GAD7 TOTAL SCORE: 0
2. NOT BEING ABLE TO STOP OR CONTROL WORRYING: NOT AT ALL

## 2019-05-15 ASSESSMENT — MIFFLIN-ST. JEOR: SCORE: 1182.93

## 2019-05-15 ASSESSMENT — PATIENT HEALTH QUESTIONNAIRE - PHQ9
5. POOR APPETITE OR OVEREATING: NOT AT ALL
SUM OF ALL RESPONSES TO PHQ QUESTIONS 1-9: 0

## 2019-05-15 NOTE — NURSING NOTE
Screening Questionnaire for Adult Immunization    Are you sick today?   No   Do you have allergies to medications, food, a vaccine component or latex?   No   Have you ever had a serious reaction after receiving a vaccination?   No   Do you have a long-term health problem with heart disease, lung disease, asthma, kidney disease, metabolic disease (e.g. diabetes), anemia, or other blood disorder?   No   Do you have cancer, leukemia, HIV/AIDS, or any other immune system problem?   No   In the past 3 months, have you taken medications that affect  your immune system, such as prednisone, other steroids, or anticancer drugs; drugs for the treatment of rheumatoid arthritis, Crohn s disease, or psoriasis; or have you had radiation treatments?   No   Have you had a seizure, or a brain or other nervous system problem?   No   During the past year, have you received a transfusion of blood or blood     products, or been given immune (gamma) globulin or antiviral drug?   No   For women: Are you pregnant or is there a chance you could become        pregnant during the next month?   No   Have you received any vaccinations in the past 4 weeks?   No     Immunization questionnaire answers were all negative.        Per orders of Dr. Marcial, injection of TDAP given by Mariah Lakhani. Patient instructed to remain in clinic for 15 minutes afterwards, and to report any adverse reaction to me immediately.       Screening performed by Mariah Lakhani on 5/15/2019 at 3:06 PM.

## 2019-05-16 ASSESSMENT — ANXIETY QUESTIONNAIRES: GAD7 TOTAL SCORE: 0

## 2019-08-08 ENCOUNTER — TELEPHONE (OUTPATIENT)
Dept: FAMILY MEDICINE | Facility: CLINIC | Age: 44
End: 2019-08-08

## 2019-08-08 ASSESSMENT — ANXIETY QUESTIONNAIRES
5. BEING SO RESTLESS THAT IT IS HARD TO SIT STILL: NOT AT ALL
2. NOT BEING ABLE TO STOP OR CONTROL WORRYING: SEVERAL DAYS
IF YOU CHECKED OFF ANY PROBLEMS ON THIS QUESTIONNAIRE, HOW DIFFICULT HAVE THESE PROBLEMS MADE IT FOR YOU TO DO YOUR WORK, TAKE CARE OF THINGS AT HOME, OR GET ALONG WITH OTHER PEOPLE: SOMEWHAT DIFFICULT
3. WORRYING TOO MUCH ABOUT DIFFERENT THINGS: SEVERAL DAYS
GAD7 TOTAL SCORE: 5
6. BECOMING EASILY ANNOYED OR IRRITABLE: MORE THAN HALF THE DAYS
4. TROUBLE RELAXING: NOT AT ALL
1. FEELING NERVOUS, ANXIOUS, OR ON EDGE: SEVERAL DAYS
7. FEELING AFRAID AS IF SOMETHING AWFUL MIGHT HAPPEN: NOT AT ALL

## 2019-08-08 ASSESSMENT — PATIENT HEALTH QUESTIONNAIRE - PHQ9: SUM OF ALL RESPONSES TO PHQ QUESTIONS 1-9: 6

## 2019-08-08 NOTE — TELEPHONE ENCOUNTER
Attempted to reach pt. There was no answer. LM to return call to RN at [phone].    Per chart review pt has no active dx of Problem List r/t depression, anxiety, or other mood disorders.   Will offer Evisit or office visit with an available provider to address her concerns. Will triage sx for any safety issues.    Mounika Mendez, NORMAN, RN

## 2019-08-08 NOTE — TELEPHONE ENCOUNTER
"RN returned call to pt.   Pt states she is having problems with anger and irritability, at home more often than elsewhere.  Pt states,\"I just get really really pissed off.\" \"I say things I don't mean.\" \"I am mad at little things that I shouldn't be getting mad at.\"   Pt states she feels bad because she is hurting peoples' feelings when she doesn't mean to react to things that way.  Pt's PCP is out of office without available appointment for approximately 1 month. Pt prefers to be seen by another available provider to discuss her irritability and her options to control these sx.    Pt was assisted scheduling appointment with another available provider next week.  PHQ-9 and CATHI-7 were completed with pt today by phone:  PHQ-9 SCORE 11/8/2017 5/15/2019 8/8/2019   PHQ-9 Total Score 10 0 6     CATHI-7 SCORE 11/8/2017 5/15/2019 8/8/2019   Total Score 5 0 5     PRISCILLA Doshi, RN        PRISCILLA Doshi, RN    "

## 2019-08-08 NOTE — TELEPHONE ENCOUNTER
Patient is calling to speak with nurse or pcp about some anger issues she is having lately wondering if this could be some anxiety/depession issues also. Patient is wondering who pcp would recommend her to see for this.     Please call to advice   Thank you

## 2019-08-09 ASSESSMENT — ANXIETY QUESTIONNAIRES: GAD7 TOTAL SCORE: 5

## 2019-08-15 ENCOUNTER — OFFICE VISIT (OUTPATIENT)
Dept: FAMILY MEDICINE | Facility: CLINIC | Age: 44
End: 2019-08-15
Payer: COMMERCIAL

## 2019-08-15 VITALS
WEIGHT: 137 LBS | RESPIRATION RATE: 19 BRPM | SYSTOLIC BLOOD PRESSURE: 110 MMHG | HEART RATE: 110 BPM | DIASTOLIC BLOOD PRESSURE: 62 MMHG | OXYGEN SATURATION: 97 % | BODY MASS INDEX: 26.9 KG/M2 | HEIGHT: 60 IN

## 2019-08-15 DIAGNOSIS — R45.4 IRRITABILITY AND ANGER: Primary | ICD-10-CM

## 2019-08-15 DIAGNOSIS — I10 BENIGN ESSENTIAL HYPERTENSION: ICD-10-CM

## 2019-08-15 DIAGNOSIS — E87.6 LOW SERUM POTASSIUM: ICD-10-CM

## 2019-08-15 DIAGNOSIS — R53.83 FATIGUE, UNSPECIFIED TYPE: ICD-10-CM

## 2019-08-15 LAB
ANION GAP SERPL CALCULATED.3IONS-SCNC: 8 MMOL/L (ref 3–14)
BASOPHILS # BLD AUTO: 0.1 10E9/L (ref 0–0.2)
BASOPHILS NFR BLD AUTO: 1.1 %
BUN SERPL-MCNC: 12 MG/DL (ref 7–30)
CALCIUM SERPL-MCNC: 9.5 MG/DL (ref 8.5–10.1)
CHLORIDE SERPL-SCNC: 103 MMOL/L (ref 94–109)
CO2 SERPL-SCNC: 28 MMOL/L (ref 20–32)
CREAT SERPL-MCNC: 0.72 MG/DL (ref 0.52–1.04)
DIFFERENTIAL METHOD BLD: ABNORMAL
EOSINOPHIL # BLD AUTO: 0.1 10E9/L (ref 0–0.7)
EOSINOPHIL NFR BLD AUTO: 1.4 %
ERYTHROCYTE [DISTWIDTH] IN BLOOD BY AUTOMATED COUNT: 14.8 % (ref 10–15)
GFR SERPL CREATININE-BSD FRML MDRD: >90 ML/MIN/{1.73_M2}
GLUCOSE SERPL-MCNC: 98 MG/DL (ref 70–99)
HCT VFR BLD AUTO: 34.6 % (ref 35–47)
HGB BLD-MCNC: 11.2 G/DL (ref 11.7–15.7)
LYMPHOCYTES # BLD AUTO: 1.7 10E9/L (ref 0.8–5.3)
LYMPHOCYTES NFR BLD AUTO: 27.7 %
MCH RBC QN AUTO: 27.6 PG (ref 26.5–33)
MCHC RBC AUTO-ENTMCNC: 32.4 G/DL (ref 31.5–36.5)
MCV RBC AUTO: 85 FL (ref 78–100)
MONOCYTES # BLD AUTO: 0.5 10E9/L (ref 0–1.3)
MONOCYTES NFR BLD AUTO: 8.6 %
NEUTROPHILS # BLD AUTO: 3.8 10E9/L (ref 1.6–8.3)
NEUTROPHILS NFR BLD AUTO: 61.2 %
PLATELET # BLD AUTO: 368 10E9/L (ref 150–450)
POTASSIUM SERPL-SCNC: 3.4 MMOL/L (ref 3.4–5.3)
RBC # BLD AUTO: 4.06 10E12/L (ref 3.8–5.2)
SODIUM SERPL-SCNC: 139 MMOL/L (ref 133–144)
T4 FREE SERPL-MCNC: 1.08 NG/DL (ref 0.76–1.46)
TSH SERPL DL<=0.005 MIU/L-ACNC: 0.27 MU/L (ref 0.4–4)
WBC # BLD AUTO: 6.3 10E9/L (ref 4–11)

## 2019-08-15 PROCEDURE — 84443 ASSAY THYROID STIM HORMONE: CPT | Performed by: PHYSICIAN ASSISTANT

## 2019-08-15 PROCEDURE — 36415 COLL VENOUS BLD VENIPUNCTURE: CPT | Performed by: PHYSICIAN ASSISTANT

## 2019-08-15 PROCEDURE — 85025 COMPLETE CBC W/AUTO DIFF WBC: CPT | Performed by: PHYSICIAN ASSISTANT

## 2019-08-15 PROCEDURE — 84439 ASSAY OF FREE THYROXINE: CPT | Performed by: PHYSICIAN ASSISTANT

## 2019-08-15 PROCEDURE — 80048 BASIC METABOLIC PNL TOTAL CA: CPT | Performed by: PHYSICIAN ASSISTANT

## 2019-08-15 PROCEDURE — 99214 OFFICE O/P EST MOD 30 MIN: CPT | Performed by: PHYSICIAN ASSISTANT

## 2019-08-15 ASSESSMENT — MIFFLIN-ST. JEOR: SCORE: 1182.93

## 2019-08-15 NOTE — PROGRESS NOTES
Subjective     Sue Westfall is a 44 year old female who presents to clinic today for the following health issues:    HPI     Irritability and anger problems- short fused, temper has increased over the past year. Throwing things at home. Arguments with . History of a hot temper. No effecting quality of life / relationship. Stressors. 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 Stressful job. Just quit job, but plans to restart a different job next week.     Social History     Tobacco Use     Smoking status: Never Smoker     Smokeless tobacco: Never Used   Substance Use Topics     Alcohol use: Yes     Drug use: No     PHQ 11/8/2017 5/15/2019 8/8/2019   PHQ-9 Total Score 10 0 6   Q9: Thoughts of better off dead/self-harm past 2 weeks Not at all Not at all Not at all     CATHI-7 SCORE 11/8/2017 5/15/2019 8/8/2019   Total Score 5 0 5           Suicide Assessment Five-step Evaluation and Treatment (SAFE-T)              Allergies   Allergen Reactions     Morphine Hcl      Itchy     BP Readings from Last 3 Encounters:   08/15/19 110/62   05/15/19 106/69   06/12/18 132/88    Wt Readings from Last 3 Encounters:   08/15/19 62.1 kg (137 lb)   05/15/19 62.1 kg (137 lb)   06/12/18 61.7 kg (136 lb)                      Reviewed and updated as needed this visit by Provider         Review of Systems   ROS COMP: Constitutional, HEENT, cardiovascular, pulmonary, GI, , musculoskeletal, neuro, skin, endocrine and psych systems are negative, except as otherwise noted.      Objective    /62   Pulse 110   Resp 19   Ht 1.524 m (5')   Wt 62.1 kg (137 lb)   SpO2 97%   BMI 26.76 kg/m    Body mass index is 26.76 kg/m .  Physical Exam     Eye exam - right eye normal lid, conjunctiva, cornea, pupil and fundus, left eye normal lid, conjunctiva, cornea, pupil and fundus.  Thyroid not palpable, not enlarged, no nodules detected.  CHEST:chest clear to IPPA, no tachypnea, retractions or cyanosis and S1,  S2 normal, no murmur, no gallop, rate regular.    Sue was seen today for mental health problem.    Diagnoses and all orders for this visit:    Irritability and anger  -     TSH with free T4 reflex  -     sertraline (ZOLOFT) 50 MG tablet; Take 1/2 tab daily for 1 week, then increase to one full pill daily thereafter  -     MENTAL HEALTH REFERRAL  - Adult; Outpatient Treatment; Individual/Couples/Family/Group Therapy/Health Psychology; [facility] [place]: [facility] [phone]; We will contact you to schedule the appointment or please call with any questions    Fatigue, unspecified type  -     CBC with platelets differential    Benign essential hypertension  -     Basic metabolic panel  (Ca, Cl, CO2, Creat, Gluc, K, Na, BUN)    Low serum potassium  -     Basic metabolic panel  (Ca, Cl, CO2, Creat, Gluc, K, Na, BUN)      work on lifestyle modification.

## 2019-10-05 ENCOUNTER — HEALTH MAINTENANCE LETTER (OUTPATIENT)
Age: 44
End: 2019-10-05

## 2019-12-15 DIAGNOSIS — R45.4 IRRITABILITY AND ANGER: ICD-10-CM

## 2019-12-16 NOTE — TELEPHONE ENCOUNTER
"Requested Prescriptions   Pending Prescriptions Disp Refills     sertraline (ZOLOFT) 50 MG tablet [Pharmacy Med Name: SERTRALINE 50MG TABLETS] 60 tablet 0     Sig: TAKE 1/2 TABLET BY MOUTH DAILY FOR 1 WEEK, THEN INCREASE TO 1 TABLET DAILY THEREAFTER       SSRIs Protocol Passed - 12/15/2019  3:12 AM        Passed - Recent (12 mo) or future (30 days) visit within the authorizing provider's specialty     Patient has had an office visit with the authorizing provider or a provider within the authorizing providers department within the previous 12 mos or has a future within next 30 days. See \"Patient Info\" tab in inbasket, or \"Choose Columns\" in Meds & Orders section of the refill encounter.              Passed - Medication is active on med list        Passed - Patient is age 18 or older        Passed - No active pregnancy on record        Passed - No positive pregnancy test in last 12 months        Last Written Prescription Date:  8/15/19  Last Fill Quantity: 60,  # refills: 0   Last office visit: 8/15/2019 with prescribing provider:  Radha Garnett     Future Office Visit:      "

## 2019-12-17 NOTE — TELEPHONE ENCOUNTER
Patient was due for follow up in September.     Please advise on refill and follow up.     Angeles Apple RN, BSN, PHN

## 2020-01-13 ENCOUNTER — OFFICE VISIT (OUTPATIENT)
Dept: FAMILY MEDICINE | Facility: CLINIC | Age: 45
End: 2020-01-13
Payer: COMMERCIAL

## 2020-01-13 VITALS
DIASTOLIC BLOOD PRESSURE: 81 MMHG | TEMPERATURE: 99 F | HEART RATE: 95 BPM | HEIGHT: 60 IN | BODY MASS INDEX: 28.58 KG/M2 | RESPIRATION RATE: 16 BRPM | SYSTOLIC BLOOD PRESSURE: 123 MMHG | WEIGHT: 145.6 LBS | OXYGEN SATURATION: 97 %

## 2020-01-13 DIAGNOSIS — R45.4 IRRITABILITY AND ANGER: ICD-10-CM

## 2020-01-13 PROCEDURE — 99214 OFFICE O/P EST MOD 30 MIN: CPT | Performed by: PHYSICIAN ASSISTANT

## 2020-01-13 RX ORDER — SERTRALINE HYDROCHLORIDE 100 MG/1
100 TABLET, FILM COATED ORAL DAILY
Qty: 90 TABLET | Refills: 1 | Status: SHIPPED | OUTPATIENT
Start: 2020-01-13 | End: 2020-07-15

## 2020-01-13 ASSESSMENT — MIFFLIN-ST. JEOR: SCORE: 1222.56

## 2020-01-13 ASSESSMENT — PATIENT HEALTH QUESTIONNAIRE - PHQ9
SUM OF ALL RESPONSES TO PHQ QUESTIONS 1-9: 7
5. POOR APPETITE OR OVEREATING: NOT AT ALL

## 2020-01-13 ASSESSMENT — ANXIETY QUESTIONNAIRES
6. BECOMING EASILY ANNOYED OR IRRITABLE: SEVERAL DAYS
5. BEING SO RESTLESS THAT IT IS HARD TO SIT STILL: NOT AT ALL
2. NOT BEING ABLE TO STOP OR CONTROL WORRYING: SEVERAL DAYS
7. FEELING AFRAID AS IF SOMETHING AWFUL MIGHT HAPPEN: NOT AT ALL
1. FEELING NERVOUS, ANXIOUS, OR ON EDGE: NOT AT ALL
3. WORRYING TOO MUCH ABOUT DIFFERENT THINGS: MORE THAN HALF THE DAYS
GAD7 TOTAL SCORE: 4
IF YOU CHECKED OFF ANY PROBLEMS ON THIS QUESTIONNAIRE, HOW DIFFICULT HAVE THESE PROBLEMS MADE IT FOR YOU TO DO YOUR WORK, TAKE CARE OF THINGS AT HOME, OR GET ALONG WITH OTHER PEOPLE: NOT DIFFICULT AT ALL

## 2020-01-13 NOTE — PROGRESS NOTES
Subjective     Sue Westfall is a 44 year old female who presents to clinic today for the following health issues:    HPI   Anxiety Follow-Up    How are you doing with your anxiety since your last visit? Improved but had a couple episodes here and there    Are you having other symptoms that might be associated with anxiety? Yes:  anger issues    Have you had a significant life event? No     Are you feeling depressed? Yes:  maybe    Do you have any concerns with your use of alcohol or other drugs? No    Social History     Tobacco Use     Smoking status: Never Smoker     Smokeless tobacco: Never Used   Substance Use Topics     Alcohol use: Yes     Comment: occasional     Drug use: No     CATHI-7 SCORE 5/15/2019 8/8/2019 1/13/2020   Total Score 0 5 4     PHQ 5/15/2019 8/8/2019 1/13/2020   PHQ-9 Total Score 0 6 7   Q9: Thoughts of better off dead/self-harm past 2 weeks Not at all Not at all Several days     Last PHQ-9 1/13/2020   1.  Little interest or pleasure in doing things 0   2.  Feeling down, depressed, or hopeless 0   3.  Trouble falling or staying asleep, or sleeping too much 0   4.  Feeling tired or having little energy 2   5.  Poor appetite or overeating 1   6.  Feeling bad about yourself 3   7.  Trouble concentrating 0   8.  Moving slowly or restless 0   Q9: Thoughts of better off dead/self-harm past 2 weeks 1   PHQ-9 Total Score 7   Difficulty at work, home, or with people Not difficult at all     CATHI-7  1/13/2020   1. Feeling nervous, anxious, or on edge 0   2. Not being able to stop or control worrying 1   3. Worrying too much about different things 2   4. Trouble relaxing 0   5. Being so restless that it is hard to sit still 0   6. Becoming easily annoyed or irritable 1   7. Feeling afraid, as if something awful might happen 0   CATHI-7 Total Score 4   If you checked any problems, how difficult have they made it for you to do your work, take care of things at home, or get along with other people? Not  difficult at all         How many servings of fruits and vegetables do you eat daily?  0-1    On average, how many sweetened beverages do you drink each day (Examples: soda, juice, sweet tea, etc.  Do NOT count diet or artificially sweetened beverages)?   0    How many days per week do you miss taking your medication? 0, sometimes the evening one        Patient Active Problem List   Diagnosis     CARDIOVASCULAR SCREENING; LDL GOAL LESS THAN 160     Other iron deficiency anemia     Benign essential hypertension     Past Surgical History:   Procedure Laterality Date     NO HISTORY OF SURGERY         Social History     Tobacco Use     Smoking status: Never Smoker     Smokeless tobacco: Never Used   Substance Use Topics     Alcohol use: Yes     Comment: occasional     Family History   Problem Relation Age of Onset     Diabetes Father      Diabetes Paternal Grandmother      Diabetes Paternal Grandfather          Current Outpatient Medications   Medication Sig Dispense Refill     chlorthalidone (HYGROTON) 50 MG tablet Take 1 tablet (50 mg) by mouth daily 90 tablet 3     lisinopril (PRINIVIL/ZESTRIL) 10 MG tablet Take 1 tablet (10 mg) by mouth daily 90 tablet 3     sertraline (ZOLOFT) 100 MG tablet Take 1 tablet (100 mg) by mouth daily TAKE 1 TABLET DAILY 90 tablet 1     norethindrone (MICRONOR) 0.35 MG per tablet Take 1 tablet (0.35 mg) by mouth daily (Patient not taking: Reported on 1/13/2020) 84 tablet 3     Allergies   Allergen Reactions     Morphine Hcl      Itchy     Recent Labs   Lab Test 08/15/19  1619 05/15/19  1427 04/30/18  0919  09/21/15  0916 12/30/13  1209   LDL  --   --  90  --  107 109   HDL  --   --  64  --  54 60   TRIG  --   --  44  --  59 73   CR 0.72 0.62 0.70   < > 0.72 0.64   GFRESTIMATED >90 >90 >90   < > 89 >90   GFRESTBLACK >90 >90 >90   < > >90   GFR Calc   >90   POTASSIUM 3.4 2.9* 3.1*   < > 3.4 3.4   TSH 0.27*  --   --   --   --   --     < > = values in this interval not  "displayed.      BP Readings from Last 3 Encounters:   01/13/20 123/81   08/15/19 110/62   05/15/19 106/69    Wt Readings from Last 3 Encounters:   01/13/20 66 kg (145 lb 9.6 oz)   08/15/19 62.1 kg (137 lb)   05/15/19 62.1 kg (137 lb)                      Reviewed and updated as needed this visit by Provider         Review of Systems   ROS COMP: Constitutional, HEENT, cardiovascular, pulmonary, GI, , musculoskeletal, neuro, skin, endocrine and psych systems are negative, except as otherwise noted.      Objective    /81   Pulse 95   Temp 99  F (37.2  C) (Tympanic)   Resp 16   Ht 1.525 m (5' 0.04\")   Wt 66 kg (145 lb 9.6 oz)   LMP 12/26/2019 (Within Days)   SpO2 97%   Breastfeeding No   BMI 28.40 kg/m    Body mass index is 28.4 kg/m .  Physical Exam     Eye exam - right eye normal lid, conjunctiva, cornea, pupil and fundus, left eye normal lid, conjunctiva, cornea, pupil and fundus.  Thyroid not palpable, not enlarged, no nodules detected.  CHEST:chest clear to IPPA, no tachypnea, retractions or cyanosis and S1, S2 normal, no murmur, no gallop, rate regular.    Sue was seen today for anxiety and health maintenance.    Diagnoses and all orders for this visit:    Irritability and anger  -     sertraline (ZOLOFT) 100 MG tablet; Take 1 tablet (100 mg) by mouth daily TAKE 1 TABLET DAILY      work on lifestyle modification  Recheck in 6 mos      "

## 2020-01-14 ASSESSMENT — ANXIETY QUESTIONNAIRES: GAD7 TOTAL SCORE: 4

## 2020-06-09 ENCOUNTER — THERAPY VISIT (OUTPATIENT)
Dept: PHYSICAL THERAPY | Facility: CLINIC | Age: 45
End: 2020-06-09
Payer: COMMERCIAL

## 2020-06-09 DIAGNOSIS — S83.511D RUPTURE OF ANTERIOR CRUCIATE LIGAMENT OF RIGHT KNEE, SUBSEQUENT ENCOUNTER: ICD-10-CM

## 2020-06-09 DIAGNOSIS — R60.0 LOCALIZED EDEMA: ICD-10-CM

## 2020-06-09 DIAGNOSIS — M25.561 ACUTE PAIN OF RIGHT KNEE: ICD-10-CM

## 2020-06-09 PROBLEM — S83.511A RIGHT ACL TEAR: Status: ACTIVE | Noted: 2020-06-09

## 2020-06-09 PROCEDURE — 97016 VASOPNEUMATIC DEVICE THERAPY: CPT | Mod: GP | Performed by: PHYSICAL THERAPIST

## 2020-06-09 PROCEDURE — 97110 THERAPEUTIC EXERCISES: CPT | Mod: GP | Performed by: PHYSICAL THERAPIST

## 2020-06-09 PROCEDURE — 97161 PT EVAL LOW COMPLEX 20 MIN: CPT | Mod: GP | Performed by: PHYSICAL THERAPIST

## 2020-06-09 ASSESSMENT — ACTIVITIES OF DAILY LIVING (ADL)
KNEEL ON THE FRONT OF YOUR KNEE: I AM UNABLE TO DO THE ACTIVITY
KNEE_ACTIVITY_OF_DAILY_LIVING_SUM: 17
PAIN: THE SYMPTOM AFFECTS MY ACTIVITY SEVERELY
STIFFNESS: THE SYMPTOM AFFECTS MY ACTIVITY SEVERELY
WEAKNESS: THE SYMPTOM AFFECTS MY ACTIVITY SEVERELY
SIT WITH YOUR KNEE BENT: I AM UNABLE TO DO THE ACTIVITY
STAND: ACTIVITY IS SOMEWHAT DIFFICULT
RAW_SCORE: 17
GO DOWN STAIRS: ACTIVITY IS FAIRLY DIFFICULT
GO UP STAIRS: ACTIVITY IS VERY DIFFICULT
SWELLING: THE SYMPTOM AFFECTS MY ACTIVITY SLIGHTLY
KNEE_ACTIVITY_OF_DAILY_LIVING_SCORE: 24.29
HOW_WOULD_YOU_RATE_THE_OVERALL_FUNCTION_OF_YOUR_KNEE_DURING_YOUR_USUAL_DAILY_ACTIVITIES?: ABNORMAL
RISE FROM A CHAIR: I AM UNABLE TO DO THE ACTIVITY
LIMPING: THE SYMPTOM AFFECTS MY ACTIVITY SEVERELY
WALK: ACTIVITY IS SOMEWHAT DIFFICULT
GIVING WAY, BUCKLING OR SHIFTING OF KNEE: THE SYMPTOM AFFECTS MY ACTIVITY SEVERELY
SQUAT: I AM UNABLE TO DO THE ACTIVITY
AS_A_RESULT_OF_YOUR_KNEE_INJURY,_HOW_WOULD_YOU_RATE_YOUR_CURRENT_LEVEL_OF_DAILY_ACTIVITY?: SEVERELY ABNORMAL
HOW_WOULD_YOU_RATE_THE_CURRENT_FUNCTION_OF_YOUR_KNEE_DURING_YOUR_USUAL_DAILY_ACTIVITIES_ON_A_SCALE_FROM_0_TO_100_WITH_100_BEING_YOUR_LEVEL_OF_KNEE_FUNCTION_PRIOR_TO_YOUR_INJURY_AND_0_BEING_THE_INABILITY_TO_PERFORM_ANY_OF_YOUR_USUAL_DAILY_ACTIVITIES?: 60

## 2020-06-09 NOTE — PROGRESS NOTES
[facility] Initial Evaluation  Subjective:  Sue Westfall is a 45 year old female with a right knee condition.    The condition occurred due to a twist.  The condition occurred at home/in the community.  This is a new condition.    Mechanism/History of injury/symptoms:  6/1/20 patient received MD orders for PT for right knee pain due to ACL tear that occurred around 5/28/20.  She was at a friends house when she felt a pop and pain in her knee when she tried to step up onto a high surface.  A few days later she turned to pivot while at home and her knee popped and buckled and she had severe pain.  The pain is located anterior, in the joing and the quality of pain is reported as sharp and achy.  The degree of pain is reported as 8/10. The timing of pain/symptoms is constant, worse during the day. Associated symptoms include: stiffness, swelling, loss of strength, limping    Symptoms are exacerbated by: walking, standing, stairs, squatting.  Symptoms are relieved by: ice.  Since onset symptoms are gradually improving.    Special tests for this condition include: MRI.  Previous treatments for this condition include: none.    General health as reported by patient is excelleng.  Pertinent medical history includes: high blood pressure.  Medical allergies include: morphine.  Other surgeries include: orthopedic (l knee), other.  Current medications:  Anti-depressants, high blood pressure    Current occupation: , listing coordinator.  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  Patient is currently working from home but not due to present treatment condition.    Potential barriers to physical therapy: none.  Red flags: none.    Previous level of function: unrestricted walking, running, stairs, squatting  Current functional restrictions:  Unable to run, walk, manage stairs, or squat normally due to right knee pain and instability.    HPI                     Objective:  KNEE:    PROM:   L  R   Hyperextension 7 5   Extension 0 0   Flexion 150 75     Strength:   L R   HIP     Flex 5/5 4/5   Ext 5/5 4/5   Abd 5/5 4/5   KNEE     Flex 5/5 4/5   Ext 5/5 Not tested, fair quad set     Special tests: deferred as patient had MRI to diagnose ACL tear    Palpation: right knee tender along tibial plateau    Patellar tracking: grossly WNL    EDEMA: Right knee joint line circumference 41 cm vs 40 cm on left    Gait: antalgic, walks on toes on right LE        System    Physical Exam    General     ROS    Assessment/Plan:    Patient is a 45 year old female with right side knee complaints.    Patient has the following significant findings with corresponding treatment plan.                Diagnosis 1:  Right knee pain due to ACL tear    Pain -  hot/cold therapy, splint/taping/bracing/orthotics, self management, education and home program  Decreased ROM/flexibility - manual therapy, therapeutic exercise and home program  Decreased strength - therapeutic exercise, therapeutic activities and home program  Decreased proprioception - neuro re-education, therapeutic activities and home program  Edema - vasopneumatics, cold therapy and self management/home program  Impaired gait - gait training and home program  Decreased function - therapeutic activities and home program  Instability -  Therapeutic Activity  Therapeutic Exercise  Neuromuscular Re-education  Splinting/Taping/Bracing/Orthotic  home program    Therapy Evaluation Codes:   1) History comprised of:   Personal factors that impact the plan of care:      None.    Comorbidity factors that impact the plan of care are:      High blood pressure.     Medications impacting care: Anti-depressant and High blood pressure.  2) Examination of Body Systems comprised of:   Body structures and functions that impact the plan of care:      Knee.   Activity limitations that impact the plan of care are:      Driving, Jumping, Running, Sports,  Squatting/kneeling, Stairs, Walking and Sleeping.  3) Clinical presentation characteristics are:   Stable/Uncomplicated.  4) Decision-Making    Low complexity using standardized patient assessment instrument and/or measureable assessment of functional outcome.  Cumulative Therapy Evaluation is: Low complexity.    Previous and current functional limitations:  (See Goal Flow Sheet for this information)    Short term and Long term goals: (See Goal Flow Sheet for this information)     Communication ability:  Patient appears to be able to clearly communicate and understand verbal and written communication and follow directions correctly.  Treatment Explanation - The following has been discussed with the patient:   RX ordered/plan of care  Anticipated outcomes  Possible risks and side effects  This patient would benefit from PT intervention to resume normal activities.   Rehab potential is good.    Frequency:  2 X week, once daily  Duration:  for 4 weeks  Discharge Plan:  Achieve all LTG.  Independent in home treatment program.  Reach maximal therapeutic benefit.    Please refer to the daily flowsheet for treatment today, total treatment time and time spent performing 1:1 timed codes.

## 2020-06-09 NOTE — LETTER
[facility] [place]  [address]  [place] [address]  [phone]    2020    Re: Sue Westfall   :   1975  MRN:  [id number]   REFERRING PHYSICIAN:   [facility]  Date of Initial Evaluation:  2020  Visits:  Rxs Used: 1  Reason for Referral:     Acute pain of right knee  Rupture of anterior cruciate ligament of right knee, subsequent encounter  Localized edema    EVALUATION SUMMARY    [facility] Initial Evaluation  Subjective:  Sue Westfall is a 45 year old female with a right knee condition.    The condition occurred due to a twist.  The condition occurred at home/in the community.  This is a new condition.    Mechanism/History of injury/symptoms:  20 patient received MD orders for PT for right knee pain due to ACL tear that occurred around 20.  She was at a friends house when she felt a pop and pain in her knee when she tried to step up onto a high surface.  A few days later she turned to pivot while at home and her knee popped and buckled and she had severe pain.    The pain is located anterior, in the joing and the quality of pain is reported as sharp and achy.  The degree of pain is reported as 8/10. The timing of pain/symptoms is constant, worse during the day. Associated symptoms include: stiffness, swelling, loss of strength, limping    Symptoms are exacerbated by: walking, standing, stairs, squatting.  Symptoms are relieved by: ice.  Since onset symptoms are gradually improving.  Special tests for this condition include: MRI.  Previous treatments for this condition include: none.    General health as reported by patient is excelleng.  Pertinent medical history includes: high blood pressure.  Medical allergies include: morphine.  Other surgeries include: orthopedic (l knee), other.  Current medications:  Anti-depressants, high blood pressure  Current occupation: , listing coordinator.  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  Patient is currently working from home but not due to present treatment condition.    Potential barriers to physical therapy: none.  Red flags: none.  Previous level of function: unrestricted walking, running, stairs, squatting  Current functional restrictions:  Unable to run, walk, manage stairs, or squat normally due to right knee pain and instability.  Sue Westfall   :   1975    HPI                  Objective:  KNEE:    PROM:   L  R   Hyperextension 7 5   Extension 0 0   Flexion 150 75     Strength:   L R   HIP     Flex 5/5 4/5   Ext 5/5 4/5   Abd 5/5 4/5   KNEE     Flex 5/5 4/5   Ext 5/5 Not tested, fair quad set     Special tests: deferred as patient had MRI to diagnose ACL tear  Palpation: right knee tender along tibial plateau  Patellar tracking: grossly WNL  EDEMA: Right knee joint line circumference 41 cm vs 40 cm on left  Gait: antalgic, walks on toes on right LE    System  Physical Exam  General   ROS    Assessment/Plan:    Patient is a 45 year old female with right side knee complaints.    Patient has the following significant findings with corresponding treatment plan.                Diagnosis 1:  Right knee pain due to ACL tear    Pain -  hot/cold therapy, splint/taping/bracing/orthotics, self management, education and home program  Decreased ROM/flexibility - manual therapy, therapeutic exercise and home program  Decreased strength - therapeutic exercise, therapeutic activities and home program  Decreased proprioception - neuro re-education, therapeutic activities and home program  Edema - vasopneumatics, cold therapy and self management/home program  Impaired gait - gait training and home program  Decreased function - therapeutic activities and home program  Instability -  Therapeutic Activity  Therapeutic Exercise  Neuromuscular Re-education  Splinting/Taping/Bracing/Orthotic  home program    Sue Westfall   :    1975    Therapy Evaluation Codes:   1) History comprised of:   Personal factors that impact the plan of care:      None.    Comorbidity factors that impact the plan of care are:      High blood pressure.     Medications impacting care: Anti-depressant and High blood pressure.  2) Examination of Body Systems comprised of:   Body structures and functions that impact the plan of care:      Knee.   Activity limitations that impact the plan of care are:      Driving, Jumping, Running, Sports, Squatting/kneeling, Stairs, Walking and Sleeping.  3) Clinical presentation characteristics are:   Stable/Uncomplicated.  4) Decision-Making    Low complexity using standardized patient assessment instrument and/or measureable assessment of functional outcome.  Cumulative Therapy Evaluation is: Low complexity.    Previous and current functional limitations:  (See Goal Flow Sheet for this information)    Short term and Long term goals: (See Goal Flow Sheet for this information)   Communication ability:  Patient appears to be able to clearly communicate and understand verbal and written communication and follow directions correctly.  Treatment Explanation - The following has been discussed with the patient:   RX ordered/plan of care  Anticipated outcomes  Possible risks and side effects  This patient would benefit from PT intervention to resume normal activities.   Rehab potential is good.  Frequency:  2 X week, once daily  Duration:  for 4 weeks  Discharge Plan:  Achieve all LTG.  Independent in home treatment program.  Reach maximal therapeutic benefit.    Thank you for your referral.    INQUIRIES  Therapist: Herbert Lux DPT, SCS  [facility]  [address] [place] [address]  Phone: [phone]  Fax: [phone]

## 2020-06-12 ENCOUNTER — THERAPY VISIT (OUTPATIENT)
Dept: PHYSICAL THERAPY | Facility: CLINIC | Age: 45
End: 2020-06-12
Payer: COMMERCIAL

## 2020-06-12 DIAGNOSIS — S83.511D RUPTURE OF ANTERIOR CRUCIATE LIGAMENT OF RIGHT KNEE, SUBSEQUENT ENCOUNTER: ICD-10-CM

## 2020-06-12 DIAGNOSIS — M25.561 ACUTE PAIN OF RIGHT KNEE: ICD-10-CM

## 2020-06-12 DIAGNOSIS — R60.0 LOCALIZED EDEMA: ICD-10-CM

## 2020-06-12 PROCEDURE — 97110 THERAPEUTIC EXERCISES: CPT | Mod: GP | Performed by: PHYSICAL THERAPIST

## 2020-06-12 PROCEDURE — 97016 VASOPNEUMATIC DEVICE THERAPY: CPT | Mod: GP | Performed by: PHYSICAL THERAPIST

## 2020-06-12 PROCEDURE — 97112 NEUROMUSCULAR REEDUCATION: CPT | Mod: GP | Performed by: PHYSICAL THERAPIST

## 2020-06-16 ENCOUNTER — THERAPY VISIT (OUTPATIENT)
Dept: PHYSICAL THERAPY | Facility: CLINIC | Age: 45
End: 2020-06-16
Payer: COMMERCIAL

## 2020-06-16 DIAGNOSIS — M25.561 ACUTE PAIN OF RIGHT KNEE: ICD-10-CM

## 2020-06-16 DIAGNOSIS — S83.511D RUPTURE OF ANTERIOR CRUCIATE LIGAMENT OF RIGHT KNEE, SUBSEQUENT ENCOUNTER: ICD-10-CM

## 2020-06-16 DIAGNOSIS — R60.0 LOCALIZED EDEMA: ICD-10-CM

## 2020-06-16 PROCEDURE — 97110 THERAPEUTIC EXERCISES: CPT | Mod: GP | Performed by: PHYSICAL THERAPIST

## 2020-06-16 PROCEDURE — 97112 NEUROMUSCULAR REEDUCATION: CPT | Mod: GP | Performed by: PHYSICAL THERAPIST

## 2020-06-16 PROCEDURE — 97016 VASOPNEUMATIC DEVICE THERAPY: CPT | Mod: GP | Performed by: PHYSICAL THERAPIST

## 2020-07-14 ENCOUNTER — THERAPY VISIT (OUTPATIENT)
Dept: PHYSICAL THERAPY | Facility: CLINIC | Age: 45
End: 2020-07-14
Payer: COMMERCIAL

## 2020-07-14 DIAGNOSIS — Z98.890 S/P ACL RECONSTRUCTION: ICD-10-CM

## 2020-07-14 PROCEDURE — 97110 THERAPEUTIC EXERCISES: CPT | Mod: GP | Performed by: PHYSICAL THERAPIST

## 2020-07-14 PROCEDURE — 97016 VASOPNEUMATIC DEVICE THERAPY: CPT | Mod: GP | Performed by: PHYSICAL THERAPIST

## 2020-07-14 PROCEDURE — 97161 PT EVAL LOW COMPLEX 20 MIN: CPT | Mod: GP | Performed by: PHYSICAL THERAPIST

## 2020-07-14 NOTE — PROGRESS NOTES
Patient is discharged from pre-op PT.  Post-op initial evaluation report to serve as discharge note for pre-op episode.

## 2020-07-14 NOTE — PROGRESS NOTES
[facility] Initial Evaluation  Subjective:  Sue Westfall is a 45 year old female with a right knee condition.    The condition occurred due to a twist.  The condition occurred at home/in the community.  This is a new condition.     Mechanism/History of injury/symptoms:  6/26/20 patient underwent ACL-R with quad tendon autograft and meniscus repair for an ACL tear that occurred around 5/28/20.  She was at a friend's house when she felt a pop and pain in her knee when she tried to step up onto a high surface.  A few days later she turned to pivot while at home and her knee popped and buckled and she had severe pain.  The pain is located anterior, in the joing and the quality of pain is reported as sharp and achy.  The degree of pain is reported as 8/10. The timing of pain/symptoms is constant, worse during the day. Associated symptoms include: stiffness, swelling, loss of strength, limping     Symptoms are exacerbated by: walking, standing, stairs, squatting, bending.  Symptoms are relieved by: ice.  Since onset symptoms are gradually improving.     Special tests for this condition include: MRI.  Previous treatments for this condition include: pre-op PT, surgery.     General health as reported by patient is excellent.  Pertinent medical history includes: high blood pressure.  Medical allergies include: morphine.  Other surgeries include: orthopedic (l knee), other.  Current medications:  Anti-depressants, high blood pressure, anti-inflammatory.     Current occupation: , listing coordinator.  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  Patient is currently working from home..     Potential barriers to physical therapy: none.  Red flags: none.     Previous level of function: unrestricted walking, running, stairs, squatting  Current functional restrictions:  Limited to walking with TTWB with brace locked and bilateral axillary crutches. Unable to run,  walk, manage stairs, or squat normally due to right knee pain and surgical limitations.  HPI                    Objective:  KNEE:    PROM:   L  R   Hyperextension 5 3   Extension 0 0   Flexion 145 60     Strength:   L R   HIP     Flex 5/5 Independent SLR with brace locked   Ext 5/5 4/5   Abd 5/5 4/5   KNEE     Flex 5/5 nt   Ext 5/5 nt     Palpation: right knee tenderness around surgical incisions    Patellar tracking: fair patellar mobility in right knee    Edema: right knee 40.5 cm vs left knee 38 cm    Gait: presents TTWB with bilateral axillary crutches and right knee in brace locked in full extension        System    Physical Exam    General     ROS    Assessment/Plan:    Patient is a 45 year old female with right side knee complaints.    Patient has the following significant findings with corresponding treatment plan.                Diagnosis 1:  S/p right ACL-R with quad tendon autograft with meniscus repair    Pain -  hot/cold therapy, electric stimulation, manual therapy, self management, education and home program  Decreased ROM/flexibility - manual therapy, therapeutic exercise and home program  Decreased strength - therapeutic exercise, therapeutic activities and home program  Decreased proprioception - neuro re-education, therapeutic activities and home program  Edema - vasopneumatics, electric stimulation, cold therapy and self management/home program  Impaired gait - gait training and home program  Decreased function - therapeutic activities, home program and functional performance testing    Therapy Evaluation Codes:   1) History comprised of:   Personal factors that impact the plan of care:      None.    Comorbidity factors that impact the plan of care are:      High blood pressure.     Medications impacting care: Anti-depressant, Anti-inflammatory and High blood pressure.  2) Examination of Body Systems comprised of:   Body structures and functions that impact the plan of care:      Knee.   Activity  limitations that impact the plan of care are:      Bathing, Driving, Dressing, Jumping, Running, Sports, Squatting/kneeling, Stairs, Standing and Walking.  3) Clinical presentation characteristics are:   Stable/Uncomplicated.  4) Decision-Making    Low complexity using standardized patient assessment instrument and/or measureable assessment of functional outcome.  Cumulative Therapy Evaluation is: Low complexity.    Previous and current functional limitations:  (See Goal Flow Sheet for this information)    Short term and Long term goals: (See Goal Flow Sheet for this information)     Communication ability:  Patient appears to be able to clearly communicate and understand verbal and written communication and follow directions correctly.  Treatment Explanation - The following has been discussed with the patient:   RX ordered/plan of care  Anticipated outcomes  Possible risks and side effects  This patient would benefit from PT intervention to resume normal activities.   Rehab potential is good.    Frequency:  2 X week, once daily  Duration:  for 3 weeks tapering to 1 X a week over   8 weeks  Discharge Plan:  Achieve all LTG.  Independent in home treatment program.  Reach maximal therapeutic benefit.    Please refer to the daily flowsheet for treatment today, total treatment time and time spent performing 1:1 timed codes.

## 2020-07-15 DIAGNOSIS — R45.4 IRRITABILITY AND ANGER: ICD-10-CM

## 2020-07-15 RX ORDER — SERTRALINE HYDROCHLORIDE 100 MG/1
TABLET, FILM COATED ORAL
Qty: 30 TABLET | Refills: 0 | Status: SHIPPED | OUTPATIENT
Start: 2020-07-15 | End: 2020-09-22

## 2020-07-15 ASSESSMENT — ACTIVITIES OF DAILY LIVING (ADL)
GO UP STAIRS: ACTIVITY IS VERY DIFFICULT
RISE FROM A CHAIR: ACTIVITY IS FAIRLY DIFFICULT
AS_A_RESULT_OF_YOUR_KNEE_INJURY,_HOW_WOULD_YOU_RATE_YOUR_CURRENT_LEVEL_OF_DAILY_ACTIVITY?: SEVERELY ABNORMAL
STAND: ACTIVITY IS MINIMALLY DIFFICULT
PAIN: THE SYMPTOM AFFECTS MY ACTIVITY SLIGHTLY
KNEE_ACTIVITY_OF_DAILY_LIVING_SUM: 13
STIFFNESS: THE SYMPTOM PREVENTS ME FROM ALL DAILY ACTIVITIES
RAW_SCORE: 13
WEAKNESS: THE SYMPTOM PREVENTS ME FROM ALL DAILY ACTIVITIES
KNEEL ON THE FRONT OF YOUR KNEE: I AM UNABLE TO DO THE ACTIVITY
GO DOWN STAIRS: ACTIVITY IS VERY DIFFICULT
KNEE_ACTIVITY_OF_DAILY_LIVING_SCORE: 18.57
HOW_WOULD_YOU_RATE_THE_CURRENT_FUNCTION_OF_YOUR_KNEE_DURING_YOUR_USUAL_DAILY_ACTIVITIES_ON_A_SCALE_FROM_0_TO_100_WITH_100_BEING_YOUR_LEVEL_OF_KNEE_FUNCTION_PRIOR_TO_YOUR_INJURY_AND_0_BEING_THE_INABILITY_TO_PERFORM_ANY_OF_YOUR_USUAL_DAILY_ACTIVITIES?: 0
WALK: I AM UNABLE TO DO THE ACTIVITY
HOW_WOULD_YOU_RATE_THE_OVERALL_FUNCTION_OF_YOUR_KNEE_DURING_YOUR_USUAL_DAILY_ACTIVITIES?: SEVERELY ABNORMAL
SWELLING: THE SYMPTOM AFFECTS MY ACTIVITY MODERATELY
GIVING WAY, BUCKLING OR SHIFTING OF KNEE: THE SYMPTOM PREVENTS ME FROM ALL DAILY ACTIVITIES
LIMPING: THE SYMPTOM PREVENTS ME FROM ALL DAILY ACTIVITIES
SQUAT: I AM UNABLE TO DO THE ACTIVITY
SIT WITH YOUR KNEE BENT: I AM UNABLE TO DO THE ACTIVITY

## 2020-07-16 NOTE — TELEPHONE ENCOUNTER
"Prescription approved per [facility] Refill Protocol.          Requested Prescriptions   Pending Prescriptions Disp Refills     sertraline (ZOLOFT) 100 MG tablet [Pharmacy Med Name: SERTRALINE 100MG TABLETS] 90 tablet 1     Sig: TAKE 1 TABLET(100 MG) BY MOUTH DAILY       SSRIs Protocol Passed - 7/15/2020  1:31 PM        Passed - Recent (12 mo) or future (30 days) visit within the authorizing provider's specialty     Patient has had an office visit with the authorizing provider or a provider within the authorizing providers department within the previous 12 mos or has a future within next 30 days. See \"Patient Info\" tab in inbasket, or \"Choose Columns\" in Meds & Orders section of the refill encounter.              Passed - Medication is active on med list        Passed - Patient is age 18 or older        Passed - No active pregnancy on record        Passed - No positive pregnancy test in last 12 months             "

## 2020-07-21 ENCOUNTER — THERAPY VISIT (OUTPATIENT)
Dept: PHYSICAL THERAPY | Facility: CLINIC | Age: 45
End: 2020-07-21
Payer: COMMERCIAL

## 2020-07-21 DIAGNOSIS — R60.0 LOCALIZED EDEMA: ICD-10-CM

## 2020-07-21 DIAGNOSIS — M25.561 ACUTE PAIN OF RIGHT KNEE: ICD-10-CM

## 2020-07-21 DIAGNOSIS — Z98.890 S/P ACL RECONSTRUCTION: ICD-10-CM

## 2020-07-21 DIAGNOSIS — S83.511D RUPTURE OF ANTERIOR CRUCIATE LIGAMENT OF RIGHT KNEE, SUBSEQUENT ENCOUNTER: ICD-10-CM

## 2020-07-21 PROCEDURE — 97110 THERAPEUTIC EXERCISES: CPT | Mod: GP | Performed by: PHYSICAL THERAPIST

## 2020-07-21 PROCEDURE — 97016 VASOPNEUMATIC DEVICE THERAPY: CPT | Mod: GP | Performed by: PHYSICAL THERAPIST

## 2020-07-22 ENCOUNTER — TELEPHONE (OUTPATIENT)
Dept: FAMILY MEDICINE | Facility: CLINIC | Age: 45
End: 2020-07-22

## 2020-07-22 NOTE — TELEPHONE ENCOUNTER
Patient is completely out of her rx for her BP and she is stating that she feels very bloated.  She is wondering if she can get a couple of the chlorthalidone (HYGROTON) 50 MG tablet, until the full refill is sent over.  Please call to advise.  Thank you

## 2020-07-22 NOTE — TELEPHONE ENCOUNTER
Pt notified 30 day supply of bp medications sent to pharmacy and she is due for ov and fasting labs next month.  Reshma Lr BSN, RN

## 2020-07-23 ENCOUNTER — THERAPY VISIT (OUTPATIENT)
Dept: PHYSICAL THERAPY | Facility: CLINIC | Age: 45
End: 2020-07-23
Payer: COMMERCIAL

## 2020-07-23 DIAGNOSIS — M25.561 ACUTE PAIN OF RIGHT KNEE: ICD-10-CM

## 2020-07-23 DIAGNOSIS — Z98.890 S/P ACL RECONSTRUCTION: ICD-10-CM

## 2020-07-23 DIAGNOSIS — S83.511D RUPTURE OF ANTERIOR CRUCIATE LIGAMENT OF RIGHT KNEE, SUBSEQUENT ENCOUNTER: ICD-10-CM

## 2020-07-23 DIAGNOSIS — R60.0 LOCALIZED EDEMA: ICD-10-CM

## 2020-07-23 PROCEDURE — 97016 VASOPNEUMATIC DEVICE THERAPY: CPT | Mod: GP | Performed by: PHYSICAL THERAPIST

## 2020-07-23 PROCEDURE — 97110 THERAPEUTIC EXERCISES: CPT | Mod: GP | Performed by: PHYSICAL THERAPIST

## 2020-07-23 PROCEDURE — 97140 MANUAL THERAPY 1/> REGIONS: CPT | Mod: GP | Performed by: PHYSICAL THERAPIST

## 2020-07-28 ENCOUNTER — THERAPY VISIT (OUTPATIENT)
Dept: PHYSICAL THERAPY | Facility: CLINIC | Age: 45
End: 2020-07-28
Payer: COMMERCIAL

## 2020-07-28 DIAGNOSIS — Z98.890 S/P ACL RECONSTRUCTION: ICD-10-CM

## 2020-07-28 DIAGNOSIS — S83.511D RUPTURE OF ANTERIOR CRUCIATE LIGAMENT OF RIGHT KNEE, SUBSEQUENT ENCOUNTER: ICD-10-CM

## 2020-07-28 DIAGNOSIS — M25.561 ACUTE PAIN OF RIGHT KNEE: ICD-10-CM

## 2020-07-28 DIAGNOSIS — R60.0 LOCALIZED EDEMA: ICD-10-CM

## 2020-07-28 PROCEDURE — 97016 VASOPNEUMATIC DEVICE THERAPY: CPT | Mod: GP | Performed by: PHYSICAL THERAPIST

## 2020-07-28 PROCEDURE — 97110 THERAPEUTIC EXERCISES: CPT | Mod: GP | Performed by: PHYSICAL THERAPIST

## 2020-07-30 ENCOUNTER — THERAPY VISIT (OUTPATIENT)
Dept: PHYSICAL THERAPY | Facility: CLINIC | Age: 45
End: 2020-07-30
Payer: COMMERCIAL

## 2020-07-30 DIAGNOSIS — R60.0 LOCALIZED EDEMA: ICD-10-CM

## 2020-07-30 DIAGNOSIS — M25.561 ACUTE PAIN OF RIGHT KNEE: ICD-10-CM

## 2020-07-30 DIAGNOSIS — S83.511D RUPTURE OF ANTERIOR CRUCIATE LIGAMENT OF RIGHT KNEE, SUBSEQUENT ENCOUNTER: ICD-10-CM

## 2020-07-30 DIAGNOSIS — Z98.890 S/P ACL RECONSTRUCTION: ICD-10-CM

## 2020-07-30 PROCEDURE — 97140 MANUAL THERAPY 1/> REGIONS: CPT | Mod: GP | Performed by: PHYSICAL THERAPIST

## 2020-07-30 PROCEDURE — 97110 THERAPEUTIC EXERCISES: CPT | Mod: GP | Performed by: PHYSICAL THERAPIST

## 2020-07-30 NOTE — PROGRESS NOTES
Subjective:  HPI  Physical Exam                    Objective:  System    Physical Exam    General     ROS    Assessment/Plan:    PROGRESS  REPORT    Progress reporting period is from 7/14/20 to 7/30/20.       SUBJECTIVE  Subjective changes noted by patient:  Sue reports her knee is coming along pretty well.  She still has some pain along the inside of her knee and it feels tight across the front of her knee.  Her exercises are going well at suraj and she feels like it is progressing well.    Current pain level is  3/10.     Previous pain level was 8/10.   Changes in function:  Yes, see above.  Adverse reaction to treatment or activity: None    OBJECTIVE  Changes noted in objective findings:  Yes  Right knee PROM 3-0-96.    Good quad set, SLR effort without extensor lag.    Mild swelling about surgical knee.    Able to ambulate with brace unlocked using one crutch, demo's good quad control with this.       ASSESSMENT/PLAN  Updated problem list and treatment plan: Diagnosis 1:  S/p R ACL-R with medial meniscus repair    Pain -  hot/cold therapy, manual therapy, self management, education and home program  Decreased ROM/flexibility - manual therapy, therapeutic exercise and home program  Decreased strength - therapeutic exercise, therapeutic activities and home program  Decreased proprioception - neuro re-education, therapeutic activities and home program  Edema - vasopneumatics, cold therapy and self management/home program  Impaired gait - gait training and home program  Decreased function - therapeutic activities and home program  STG/LTGs have been met or progress has been made towards goals:  Yes (See Goal flow sheet completed today.)  Assessment of Progress: The patient's condition is improving.  The patient's condition has potential to improve.  Self Management Plans:  Patient has been instructed in a home treatment program.  Patient  has been instructed in self management of symptoms.    Sue continues to  require the following intervention to meet STG and LTG's:  PT    Recommendations:  This patient would benefit from continued therapy.     Frequency:  1-2 X week, once daily  Duration:  for 6 weeks tapering to 2 X a month over 3 months        Please refer to the daily flowsheet for treatment today, total treatment time and time spent performing 1:1 timed codes.

## 2020-08-05 ENCOUNTER — THERAPY VISIT (OUTPATIENT)
Dept: PHYSICAL THERAPY | Facility: CLINIC | Age: 45
End: 2020-08-05
Payer: COMMERCIAL

## 2020-08-05 DIAGNOSIS — S83.511D RUPTURE OF ANTERIOR CRUCIATE LIGAMENT OF RIGHT KNEE, SUBSEQUENT ENCOUNTER: ICD-10-CM

## 2020-08-05 DIAGNOSIS — R60.0 LOCALIZED EDEMA: ICD-10-CM

## 2020-08-05 DIAGNOSIS — M25.561 ACUTE PAIN OF RIGHT KNEE: ICD-10-CM

## 2020-08-05 DIAGNOSIS — Z98.890 S/P ACL RECONSTRUCTION: ICD-10-CM

## 2020-08-05 PROCEDURE — 97110 THERAPEUTIC EXERCISES: CPT | Mod: GP | Performed by: PHYSICAL THERAPIST

## 2020-08-05 PROCEDURE — 97016 VASOPNEUMATIC DEVICE THERAPY: CPT | Mod: GP | Performed by: PHYSICAL THERAPIST

## 2020-08-05 PROCEDURE — 97112 NEUROMUSCULAR REEDUCATION: CPT | Mod: GP | Performed by: PHYSICAL THERAPIST

## 2020-08-14 ENCOUNTER — THERAPY VISIT (OUTPATIENT)
Dept: PHYSICAL THERAPY | Facility: CLINIC | Age: 45
End: 2020-08-14
Payer: COMMERCIAL

## 2020-08-14 DIAGNOSIS — M25.561 ACUTE PAIN OF RIGHT KNEE: ICD-10-CM

## 2020-08-14 DIAGNOSIS — R60.0 LOCALIZED EDEMA: ICD-10-CM

## 2020-08-14 DIAGNOSIS — Z98.890 S/P ACL RECONSTRUCTION: ICD-10-CM

## 2020-08-14 DIAGNOSIS — S83.511D RUPTURE OF ANTERIOR CRUCIATE LIGAMENT OF RIGHT KNEE, SUBSEQUENT ENCOUNTER: ICD-10-CM

## 2020-08-14 PROCEDURE — 97112 NEUROMUSCULAR REEDUCATION: CPT | Mod: GP | Performed by: PHYSICAL THERAPIST

## 2020-08-14 PROCEDURE — 97016 VASOPNEUMATIC DEVICE THERAPY: CPT | Mod: GP | Performed by: PHYSICAL THERAPIST

## 2020-08-14 PROCEDURE — 97110 THERAPEUTIC EXERCISES: CPT | Mod: GP | Performed by: PHYSICAL THERAPIST

## 2020-09-01 ENCOUNTER — THERAPY VISIT (OUTPATIENT)
Dept: PHYSICAL THERAPY | Facility: CLINIC | Age: 45
End: 2020-09-01
Payer: COMMERCIAL

## 2020-09-01 DIAGNOSIS — R60.0 LOCALIZED EDEMA: ICD-10-CM

## 2020-09-01 DIAGNOSIS — M25.561 ACUTE PAIN OF RIGHT KNEE: ICD-10-CM

## 2020-09-01 DIAGNOSIS — S83.511D RUPTURE OF ANTERIOR CRUCIATE LIGAMENT OF RIGHT KNEE, SUBSEQUENT ENCOUNTER: ICD-10-CM

## 2020-09-01 DIAGNOSIS — Z98.890 S/P ACL RECONSTRUCTION: ICD-10-CM

## 2020-09-01 PROCEDURE — 97110 THERAPEUTIC EXERCISES: CPT | Mod: GP | Performed by: PHYSICAL THERAPIST

## 2020-09-01 PROCEDURE — 97112 NEUROMUSCULAR REEDUCATION: CPT | Mod: GP | Performed by: PHYSICAL THERAPIST

## 2020-09-16 ENCOUNTER — THERAPY VISIT (OUTPATIENT)
Dept: PHYSICAL THERAPY | Facility: CLINIC | Age: 45
End: 2020-09-16
Payer: COMMERCIAL

## 2020-09-16 DIAGNOSIS — Z98.890 S/P ACL RECONSTRUCTION: ICD-10-CM

## 2020-09-16 DIAGNOSIS — S83.511D RUPTURE OF ANTERIOR CRUCIATE LIGAMENT OF RIGHT KNEE, SUBSEQUENT ENCOUNTER: ICD-10-CM

## 2020-09-16 DIAGNOSIS — M25.561 ACUTE PAIN OF RIGHT KNEE: ICD-10-CM

## 2020-09-16 DIAGNOSIS — R60.0 LOCALIZED EDEMA: ICD-10-CM

## 2020-09-16 PROCEDURE — 97112 NEUROMUSCULAR REEDUCATION: CPT | Mod: GP | Performed by: PHYSICAL THERAPIST

## 2020-09-16 PROCEDURE — 97110 THERAPEUTIC EXERCISES: CPT | Mod: GP | Performed by: PHYSICAL THERAPIST

## 2020-09-21 DIAGNOSIS — R45.4 IRRITABILITY AND ANGER: ICD-10-CM

## 2020-09-22 NOTE — TELEPHONE ENCOUNTER
Last Written Prescription Date:  7/15/2020  Last Fill Quantity: 30,  # refills: 0   Last office visit: 1/13/2020 with prescribing provider:  Marlon Knapp with advised F/U in 6 months   Future Office Visit: none    Routing refill request to provider for review/approval because:  Patient needs to be seen because:  Overdue for F/U   Break in medication, last prescribed 7/15 for 30 day supply     Please advise.    Med pended with reminder due for appt. Please advise.    Rita Coronado, RN, BSN

## 2020-09-23 RX ORDER — SERTRALINE HYDROCHLORIDE 100 MG/1
100 TABLET, FILM COATED ORAL DAILY
Qty: 30 TABLET | Refills: 0 | Status: SHIPPED | OUTPATIENT
Start: 2020-09-23 | End: 2023-05-04

## 2020-09-28 ENCOUNTER — THERAPY VISIT (OUTPATIENT)
Dept: PHYSICAL THERAPY | Facility: CLINIC | Age: 45
End: 2020-09-28
Payer: COMMERCIAL

## 2020-09-28 DIAGNOSIS — Z98.890 S/P ACL RECONSTRUCTION: ICD-10-CM

## 2020-09-28 DIAGNOSIS — S83.511D RUPTURE OF ANTERIOR CRUCIATE LIGAMENT OF RIGHT KNEE, SUBSEQUENT ENCOUNTER: ICD-10-CM

## 2020-09-28 DIAGNOSIS — M25.561 ACUTE PAIN OF RIGHT KNEE: ICD-10-CM

## 2020-09-28 DIAGNOSIS — R60.0 LOCALIZED EDEMA: ICD-10-CM

## 2020-09-28 PROCEDURE — 97110 THERAPEUTIC EXERCISES: CPT | Mod: GP | Performed by: PHYSICAL THERAPIST

## 2020-09-28 PROCEDURE — 97112 NEUROMUSCULAR REEDUCATION: CPT | Mod: GP | Performed by: PHYSICAL THERAPIST

## 2020-09-28 NOTE — PROGRESS NOTES
"Subjective:  HPI  Physical Exam                    Objective:  System    Physical Exam    General     ROS    Assessment/Plan:    PROGRESS  REPORT    Progress reporting period is from 7/30/20 to 9/28/20.       SUBJECTIVE  Subjective changes noted by patient: Sue reports her knee is doing really well. She denies any major issues or concerns with her knee.  She feels like the motion is doing well and her strength is improving.  She can go up stairs well but going down is still a little bit challenging.  She has been doing some workouts with a  friend and these are going well.    Current pain level is 0/10.     Previous pain level was 8/10.   Changes in function:  Yes, see above.  Adverse reaction to treatment or activity: None    OBJECTIVE  Changes noted in objective findings:  Yes  Norrmal walking gait.    Able to go up and down 13 stairs reciprocally without a railing, fair eccentric control descending stairs.   Right knee PROM:  5-0-135    Tenderness on anteromedial incision at proximal tibia.    Good body control with double leg squat, verbal cue to maintain even LE loading.    Challenged by 4\" lateral and forward step downs.     ASSESSMENT/PLAN  Updated problem list and treatment plan: Diagnosis 1:  S/p R ACL-R    Decreased ROM/flexibility - manual therapy, therapeutic exercise and home program  Decreased strength - therapeutic exercise, therapeutic activities and home program  Decreased proprioception - neuro re-education, therapeutic activities and home program  Decreased function - therapeutic activities and home program  STG/LTGs have been met or progress has been made towards goals:  Yes (See Goal flow sheet completed today.)  Assessment of Progress: The patient's condition is improving.  The patient's condition has potential to improve.  Patient is meeting short term goals and is progressing towards long term goals.  Self Management Plans:  Patient has been instructed in a home treatment " program.  Patient  has been instructed in self management of symptoms.    Sue continues to require the following intervention to meet STG and LTG's:  PT    Recommendations:  This patient would benefit from continued therapy.     Frequency:  2 X a month, once daily  Duration:  for 3 months        Please refer to the daily flowsheet for treatment today, total treatment time and time spent performing 1:1 timed codes.

## 2020-09-28 NOTE — LETTER
"BARRY [facility]  [address]  [place] [address]  [phone]    2020    Re: Sue Westfall   :   1975  MRN:  [id number]   REFERRING PHYSICIAN:   Yolette [facility]    Date of Initial Evaluation:  2020  Visits:  Rxs Used: 10  Reason for Referral:     S/P ACL reconstruction  Acute pain of right knee  Localized edema  Rupture of anterior cruciate ligament of right knee, subsequent encounter        PROGRESS  REPORT    Progress reporting period is from 20 to 20.       SUBJECTIVE  Subjective changes noted by patient: Sue reports her knee is doing really well. She denies any major issues or concerns with her knee.  She feels like the motion is doing well and her strength is improving.  She can go up stairs well but going down is still a little bit challenging.  She has been doing some workouts with a  friend and these are going well.    Current pain level is 0/10.     Previous pain level was 8/10.   Changes in function:  Yes, see above.  Adverse reaction to treatment or activity: None    OBJECTIVE  Changes noted in objective findings:  Yes  Norrmal walking gait.    Able to go up and down 13 stairs reciprocally without a railing, fair eccentric control descending stairs.   Right knee PROM:  5-0-135    Tenderness on anteromedial incision at proximal tibia.    Good body control with double leg squat, verbal cue to maintain even LE loading.    Challenged by 4\" lateral and forward step downs.     ASSESSMENT/PLAN  Updated problem list and treatment plan: Diagnosis 1:  S/p R ACL-R    Decreased ROM/flexibility - manual therapy, therapeutic exercise and home program  Decreased strength - therapeutic exercise, therapeutic activities and home program  Decreased proprioception - neuro re-education, therapeutic activities and home program  Decreased function - therapeutic activities and home program  STG/LTGs have been met or progress has been made " towards goals:  Yes (See Goal flow sheet completed today.)  Assessment of Progress: The patient's condition is improving.  The patient's condition has potential to improve.  Patient is meeting short term goals and is progressing towards long term goals.  Self Management Plans:  Patient has been instructed in a home treatment program.  Patient  has been instructed in self management of symptoms.    Sue continues to require the following intervention to meet STG and LTG's:  PT    Recommendations:  This patient would benefit from continued therapy.     Frequency:  2 X a month, once daily  Duration:  for 3 months                Thank you for your referral.    INQUIRIES  Therapist:  ROSALIE Lance [facility]  [address]  [place] [address]  Phone: [phone]  Fax: [phone]

## 2020-11-14 ENCOUNTER — HEALTH MAINTENANCE LETTER (OUTPATIENT)
Age: 45
End: 2020-11-14

## 2021-02-02 PROBLEM — M25.561 ACUTE PAIN OF RIGHT KNEE: Status: RESOLVED | Noted: 2020-06-09 | Resolved: 2021-02-02

## 2021-02-02 PROBLEM — S83.511A RIGHT ACL TEAR: Status: RESOLVED | Noted: 2020-06-09 | Resolved: 2021-02-02

## 2021-02-02 PROBLEM — Z98.890 S/P ACL RECONSTRUCTION: Status: RESOLVED | Noted: 2020-07-14 | Resolved: 2021-02-02

## 2021-02-02 PROBLEM — R60.0 LOCALIZED EDEMA: Status: RESOLVED | Noted: 2020-06-09 | Resolved: 2021-02-02

## 2021-02-02 NOTE — PROGRESS NOTES
Patient is discharged from PT.  Please refer to progress note dated 9/28/20 for last known functional status as well as final objective/subjective values.

## 2021-02-06 ENCOUNTER — HEALTH MAINTENANCE LETTER (OUTPATIENT)
Age: 46
End: 2021-02-06

## 2021-06-09 NOTE — PROGRESS NOTES
Assessment & Plan     (N30.00) Acute cystitis without hematuria  (R35.0) Urinary frequency  Comment: Recurrence of symptoms after recent treatment with bactrim.  Plan: UA reflex to Microscopic and Culture, Urine         Culture Aerobic Bacterial, nitroFURantoin         macrocrystal-monohydrate (MACROBID) 100 MG         capsule    (N92.1) Menorrhagia with irregular cycle  (primary encounter diagnosis)  Comment: Ongoing x 6 weeks.  Plan: CBC with platelets, OB/GYN REFERRAL, Urine         Microscopic         (D50.9) Iron deficiency anemia, unspecified iron deficiency anemia type  Comment: Chronic, not on iron supplementation  Plan: CBC with platelets, Ferritin           Return in about 2 weeks (around 6/24/2021) for worsening symptoms or failure to improve.    KEITH Khan [facility] EMILIANO Rogers is a 46 year old who presents for the following health issues     HPI     Menstrual Concern  Onset/Duration: Period started end of April 2021  Description:   Duration of bleeding episodes: continous   Frequency of periods: (1st day of one to 1st day of next): irregular since 10/2020  Describe bleeding/flow:   Clots: YES  Number of pads/day: 6        Cramping: none  Accompanying Signs & Symptoms:  Lightheadedness: no  Temperature intolerance: no  Nosebleeds/Easy bruising: no  Vaginal Discharge: no  Acne: no  Change in body hair: no  History:  No LMP recorded.  Previous normal periods: no   Contraceptive use: YES  Sexually active: not currently  Any bleeding after intercourse: YES  Abnormal PAP Smears: no  Precipitating or alleviating factors: None  Therapies tried and outcome: None    Presents with c/o heavy vaginal bleeding for the past 6 weeks.  Since Oct 2020 has been having irregular menstrual cycles.  She was referred to OBGYN in October but never went. She soaks a super plus pad 4-6 times per day. Noticing large clots, no cramping. Has a history of CARLITOS, not currently taking  "an iron supplement or multivitamin.       Was treated for UTI 6 days ago through [facility] with Bactrim and Pyridium. She completed her course of antibiotics. Today, she continues to have frequency, urgency, and lower abdominal/pelvic pressure. Denies any dysuria or hematuria. Denies fevers, chills, or back pain.         Review of Systems   CONSTITUTIONAL: NEGATIVE for fever, chills, change in weight  GI: NEGATIVE for nausea, abdominal pain, heartburn, or change in bowel habits  : See HPI.      Objective    /89   Pulse 95   Temp 98.4  F (36.9  C) (Oral)   Ht 1.518 m (4' 11.75\")   Wt 76.5 kg (168 lb 9.6 oz)   SpO2 96%   BMI 33.20 kg/m    Body mass index is 33.2 kg/m .  Physical Exam   GENERAL: healthy, alert and no distress  RESP: lungs clear to auscultation - no rales, rhonchi or wheezes  CV: regular rate and rhythm, normal S1 S2, no S3 or S4, no murmur, click or rub, no peripheral edema and peripheral pulses strong  ABDOMEN: soft, nontender, no hepatosplenomegaly, no masses and bowel sounds normal.   MSK: No CVA tenderness.  PSYCH: mentation appears normal, affect normal/bright          "

## 2021-06-10 ENCOUNTER — OFFICE VISIT (OUTPATIENT)
Dept: FAMILY MEDICINE | Facility: CLINIC | Age: 46
End: 2021-06-10
Payer: COMMERCIAL

## 2021-06-10 VITALS
TEMPERATURE: 98.4 F | BODY MASS INDEX: 33.1 KG/M2 | DIASTOLIC BLOOD PRESSURE: 89 MMHG | HEART RATE: 95 BPM | SYSTOLIC BLOOD PRESSURE: 132 MMHG | OXYGEN SATURATION: 96 % | HEIGHT: 60 IN | WEIGHT: 168.6 LBS

## 2021-06-10 DIAGNOSIS — D50.9 IRON DEFICIENCY ANEMIA, UNSPECIFIED IRON DEFICIENCY ANEMIA TYPE: ICD-10-CM

## 2021-06-10 DIAGNOSIS — N92.1 MENORRHAGIA WITH IRREGULAR CYCLE: ICD-10-CM

## 2021-06-10 DIAGNOSIS — R35.0 URINARY FREQUENCY: ICD-10-CM

## 2021-06-10 DIAGNOSIS — N30.00 ACUTE CYSTITIS WITHOUT HEMATURIA: Primary | ICD-10-CM

## 2021-06-10 LAB
ALBUMIN UR-MCNC: NEGATIVE MG/DL
APPEARANCE UR: CLEAR
BILIRUB UR QL STRIP: NEGATIVE
COLOR UR AUTO: YELLOW
ERYTHROCYTE [DISTWIDTH] IN BLOOD BY AUTOMATED COUNT: 13.8 % (ref 10–15)
FERRITIN SERPL-MCNC: 16 NG/ML (ref 8–252)
GLUCOSE UR STRIP-MCNC: NEGATIVE MG/DL
HCT VFR BLD AUTO: 35 % (ref 35–47)
HGB BLD-MCNC: 11.5 G/DL (ref 11.7–15.7)
HGB UR QL STRIP: ABNORMAL
KETONES UR STRIP-MCNC: NEGATIVE MG/DL
LEUKOCYTE ESTERASE UR QL STRIP: NEGATIVE
MCH RBC QN AUTO: 30 PG (ref 26.5–33)
MCHC RBC AUTO-ENTMCNC: 32.9 G/DL (ref 31.5–36.5)
MCV RBC AUTO: 91 FL (ref 78–100)
NITRATE UR QL: NEGATIVE
NON-SQ EPI CELLS #/AREA URNS LPF: ABNORMAL /LPF
PH UR STRIP: 6 PH (ref 5–7)
PLATELET # BLD AUTO: 324 10E9/L (ref 150–450)
RBC # BLD AUTO: 3.83 10E12/L (ref 3.8–5.2)
RBC #/AREA URNS AUTO: ABNORMAL /HPF
SOURCE: ABNORMAL
SP GR UR STRIP: 1.02 (ref 1–1.03)
UROBILINOGEN UR STRIP-ACNC: 0.2 EU/DL (ref 0.2–1)
WBC # BLD AUTO: 6.5 10E9/L (ref 4–11)
WBC #/AREA URNS AUTO: ABNORMAL /HPF

## 2021-06-10 PROCEDURE — 36415 COLL VENOUS BLD VENIPUNCTURE: CPT | Performed by: NURSE PRACTITIONER

## 2021-06-10 PROCEDURE — 99214 OFFICE O/P EST MOD 30 MIN: CPT | Performed by: NURSE PRACTITIONER

## 2021-06-10 PROCEDURE — 81001 URINALYSIS AUTO W/SCOPE: CPT | Performed by: NURSE PRACTITIONER

## 2021-06-10 PROCEDURE — 85027 COMPLETE CBC AUTOMATED: CPT | Performed by: NURSE PRACTITIONER

## 2021-06-10 PROCEDURE — 87086 URINE CULTURE/COLONY COUNT: CPT | Performed by: NURSE PRACTITIONER

## 2021-06-10 PROCEDURE — 82728 ASSAY OF FERRITIN: CPT | Performed by: NURSE PRACTITIONER

## 2021-06-10 RX ORDER — NITROFURANTOIN 25; 75 MG/1; MG/1
100 CAPSULE ORAL 2 TIMES DAILY
Qty: 14 CAPSULE | Refills: 0 | Status: SHIPPED | OUTPATIENT
Start: 2021-06-10 | End: 2021-06-17

## 2021-06-10 ASSESSMENT — MIFFLIN-ST. JEOR: SCORE: 1312.29

## 2021-06-11 LAB
BACTERIA SPEC CULT: NO GROWTH
Lab: NORMAL
SPECIMEN SOURCE: NORMAL

## 2021-06-15 NOTE — PROGRESS NOTES
Assessment & Plan     Excessive or frequent menstruation  Discussed our understanding of menstrual changes with patient. Discussed possible etiologies for the menstrual cycle changes. Bloodwork from last visit reviewed. Plan on sending patient for a pelvic US to further evaluate the reproductive structures. We did discuss the possibility of endometrial hyperplasia or carcinoma and the possibility of doing an endometrial biopsy today and the patient will consider this.  We briefly reviewed risks and benefits of medical versus surgical therapy. Does have a history of HTN-on medication so would not recommend long term use of combined hormonal medication, briefly reviewed progesterone only options. Reviewed surgical options such as endometrial ablation versus hysterectomy.  Discussed that endometrial ablation is minimally invasive compared to hysterectomy but may not be definitive.  Patient is considering surgical intervention. We will notify her when we have all her results available and discuss options more in depth at that time. She is given an opportunity to ask questions and have them answered.  - US Pelvic Complete with Transvaginal; Future  {  KEITH Butler CNP  [facility] EMILIANO Rogers is a 46 year old who presents for the following health issues     HPI     Menorrhagia with irregular cycle    Prior history of regular menstrual cycles. Had a cycle in Oct 2020, then no bleeding until 4/29/21. When she did begin bleeding, was heavy with clots. Just in the last several days, has really slowed down and is nearly resolved now. No cramping during this time, some fatigue, dizziness. At its worst, changing 4-6 super tampons daily that were mostly saturated. Does have a history of iron deficiency anemia. Per patient, no chance of pregnancy recently. Labs checked by  earlier this month and was started on ferrous sulfate.     Review of Systems   Constitutional, HEENT,  cardiovascular, pulmonary, gi and gu systems are negative, except as otherwise noted.      Objective    /82 (BP Location: Right arm, Patient Position: Sitting, Cuff Size: Adult Regular)   Pulse 95   Temp 97.5  F (36.4  C) (Tympanic)   Ht 1.524 m (5')   Wt 77 kg (169 lb 12.8 oz)   LMP 04/29/2021 (Approximate)   SpO2 93%   BMI 33.16 kg/m    Body mass index is 33.16 kg/m .  Physical Exam   GENERAL: healthy, alert and no distress  ABDOMEN: soft, nontender, no hepatosplenomegaly, no masses and bowel sounds normal   (female): normal female external genitalia, normal urethral meatus, vaginal mucosa, normal cervix/adnexa/uterus without masses or discharge-except small amount of blood present in vagina  MS: no gross musculoskeletal defects noted, no edema  SKIN: no suspicious lesions or rashes  PSYCH: mentation appears normal, affect normal/bright

## 2021-06-21 ENCOUNTER — OFFICE VISIT (OUTPATIENT)
Dept: OBGYN | Facility: CLINIC | Age: 46
End: 2021-06-21
Payer: COMMERCIAL

## 2021-06-21 VITALS
HEIGHT: 60 IN | WEIGHT: 169.8 LBS | DIASTOLIC BLOOD PRESSURE: 82 MMHG | OXYGEN SATURATION: 93 % | HEART RATE: 95 BPM | TEMPERATURE: 97.5 F | SYSTOLIC BLOOD PRESSURE: 138 MMHG | BODY MASS INDEX: 33.34 KG/M2

## 2021-06-21 DIAGNOSIS — N92.0 EXCESSIVE OR FREQUENT MENSTRUATION: Primary | ICD-10-CM

## 2021-06-21 PROCEDURE — 99202 OFFICE O/P NEW SF 15 MIN: CPT | Performed by: NURSE PRACTITIONER

## 2021-06-21 ASSESSMENT — PAIN SCALES - GENERAL: PAINLEVEL: NO PAIN (0)

## 2021-06-21 ASSESSMENT — MIFFLIN-ST. JEOR: SCORE: 1321.71

## 2021-06-22 ENCOUNTER — ANCILLARY PROCEDURE (OUTPATIENT)
Dept: ULTRASOUND IMAGING | Facility: CLINIC | Age: 46
End: 2021-06-22
Attending: NURSE PRACTITIONER
Payer: COMMERCIAL

## 2021-06-22 DIAGNOSIS — N92.0 EXCESSIVE OR FREQUENT MENSTRUATION: ICD-10-CM

## 2021-06-22 PROCEDURE — 76830 TRANSVAGINAL US NON-OB: CPT | Performed by: RADIOLOGY

## 2021-06-22 PROCEDURE — 76856 US EXAM PELVIC COMPLETE: CPT | Performed by: RADIOLOGY

## 2021-06-28 ENCOUNTER — OFFICE VISIT (OUTPATIENT)
Dept: OBGYN | Facility: CLINIC | Age: 46
End: 2021-06-28
Payer: COMMERCIAL

## 2021-06-28 VITALS
DIASTOLIC BLOOD PRESSURE: 85 MMHG | WEIGHT: 171 LBS | HEART RATE: 83 BPM | SYSTOLIC BLOOD PRESSURE: 130 MMHG | BODY MASS INDEX: 33.4 KG/M2 | OXYGEN SATURATION: 96 %

## 2021-06-28 DIAGNOSIS — N92.1 MENOMETRORRHAGIA: ICD-10-CM

## 2021-06-28 DIAGNOSIS — Z12.31 ENCOUNTER FOR SCREENING MAMMOGRAM FOR BREAST CANCER: Primary | ICD-10-CM

## 2021-06-28 PROCEDURE — 99215 OFFICE O/P EST HI 40 MIN: CPT | Performed by: OBSTETRICS & GYNECOLOGY

## 2021-06-28 RX ORDER — NORETHINDRONE ACETATE 5 MG
5 TABLET ORAL DAILY
Qty: 90 TABLET | Refills: 3 | Status: SHIPPED | OUTPATIENT
Start: 2021-06-28 | End: 2022-07-19

## 2021-06-28 NOTE — PROGRESS NOTES
Sue is a 46 year old   is here today complaining of heavy and irregular bleeding since April.  It has been heavy with clots. She stopped for about 6 months, before this she had been skipping cycles.      ROS: Pertinent ROS as above.    Gyn Hx:      Past Medical History:   Diagnosis Date     NO ACTIVE PROBLEMS      Past Surgical History:   Procedure Laterality Date     NO HISTORY OF SURGERY       Patient Active Problem List   Diagnosis     CARDIOVASCULAR SCREENING; LDL GOAL LESS THAN 160     Other iron deficiency anemia     Benign essential hypertension       ALL/Meds: Her medication and allergy histories were reviewed and are documented in their appropriate chart areas.    SH: Reviewed and documented in the appropriate area of the chart.  FH:  Her family history is reviewed and updated in the chart, today.  PMH: Her past medical, surgical, and obstetric histories were reviewed and updated today in the appropriate chart areas.    PE: /85 (BP Location: Right arm, Patient Position: Sitting, Cuff Size: Adult Regular)   Pulse 83   Wt 77.6 kg (171 lb)   LMP 2021 (Approximate)   SpO2 96%   BMI 33.40 kg/m    Body mass index is 33.4 kg/m .    Pertinent Physical exam findings:    General Appearance:  healthy, alert, active, no distress    U/S:  UTERUS: 11.0 x 3.5 x 5.5 cm. Normal in size and position with no  masses. Multiple nabothian cysts are present with the largest  measuring up to 3.7 cm.     ENDOMETRIUM: 7 mm. Normal smooth endometrium.     RIGHT OVARY: 2.0 x 1.3 x 2.2 cm. Normal with flow demonstrated. Ovary  only visualized transabdominally.     LEFT OVARY: Obscured by bowel gas.     No significant free fluid.                                                                      IMPRESSION:  1.  Prominent nabothian cysts. No specific follow-up suggested.  2.  No significant endometrial thickening. No evidence of uterine  fibroids.    Discussed with Sue, the options for treatment.  We  discussed medical management including both oral and non-oral options.  We discussed the risks and benefits of cyclic and continuous estrogen/progestin combinations including thrombotic events.  We discussed cyclic and and continuous progestins including breakthough bleeding.    Discussed endometrial ablation.  Discussed hysteroscopy and how it is performed.  Explained the outpatient nature and post-operative recovery.  Discussed the success rate including 50% amenorrhea, 40% improvement and 10% failure.  Discussed the need for permanent birth control.   Discussed the need for sampling of the endometrium prior to the ablation.  Discussed the option of an in-office ENDOMETRIAL BIOPSY versus hysteroscopic directed biopsy done immediately prior to the ablation.  She does understand that we would not have the results of that biopsy back prior to the ablation.  If the results were to show a pre-malignant or malignant process, she understands that she would have to come back at a later date for a hysterectomy.     Reviewed the risks, benefits, and alternatives of hysterectomy including but not limited to bleeding, infection, injury to bowel,bladder and other structures.  The patient is aware that hysterectomy will render her sterile and unable to have further children.  We discussed the different routes of surgery including abdominal, vaginal, and laparoscopic and the possibility that the route of surgery may change during the procedure.  We discussed both total and supracervical hysterectomy and the benefits and contraindications involved.  We discussed ovarian sparing as well as oophrectomy. Reviewed pre and post op course. Patient was given the opportunity to ask questions and have them answered.   The patient is willing to try hormonal treatment      A/P:(Z12.31) Encounter for screening mammogram for breast cancer  (primary encounter diagnosis)    Plan: *MA Screening Digital Bilateral            (N92.1)  Menometrorrhagia  Comment: 45 minutes spent on the date of the encounter doing chart review, review of test results, patient visit and documentation   Plan: norethindrone (AYGESTIN) 5 MG tablet        She will follow up in 30-60 days         -     -   Orders Placed This Encounter   Procedures     *MA Screening Digital Bilateral

## 2021-08-10 ENCOUNTER — TELEPHONE (OUTPATIENT)
Dept: OBGYN | Facility: CLINIC | Age: 46
End: 2021-08-10

## 2021-08-10 NOTE — TELEPHONE ENCOUNTER
"Patient is a 46 years old female, last seen in clinic on 6/28/21 for heavy and irregular bleeding. Patient was prescribed northindrone (AYGESTIN) 5 mg tablets. She has been taking since 6/28/21.     RN called and spoke to patient. She states she has a hx of migraines, but hasn't had one for quite some time and never this bad. Her migraine started yesterday and she could not go to work. It was mostly on left side of head and in back of eyes. Her jaw and teeth hurt, as well as her throat. On a pain scale she rates it  \"21/10\" with constant throbbing and intermittent sharp, shooting pains. She took 800 mg of Ibuprofen and unable to state if it was helpful. She rested it off and is at work today with a faint headache. Patient has not held medication at this time.     RN routing to Dr. Calabrese for further advisement and update patient. Patient verbalized understanding and agreed to plan.    Dede Flores RN on 8/10/2021 at 10:11 AM          " PULMONARY PROGRESS NOTE:    REASON FOR VISIT:Pl effusion, lung mass    Interval History:    Shortness of Breath: improved   Cough: yes   Sputum: no          Hemoptysis: no  Chest Pain: no  Fever: no                   Swelling Feet: yes   Headache: no                                           Nausea, Emesis, Abdominal Pain: no  Diarrhea: no         Constipation: no    Events since last visit: CXR improved.      PAST MEDICAL HISTORY:      Scheduled Meds:   amoxicillin-clavulanate  1 tablet Oral 2 times per day    predniSONE  20 mg Oral Daily    furosemide  20 mg Intravenous Daily    vancomycin (VANCOCIN) intermittent dosing (placeholder)   Other RX Placeholder    sodium chloride flush  10 mL Intravenous 2 times per day    enoxaparin  40 mg Subcutaneous Daily    budesonide-formoterol  2 puff Inhalation BID    dilTIAZem  120 mg Oral Daily     Continuous Infusions:  PRN Meds:ipratropium, sodium chloride flush, sodium chloride flush, acetaminophen, levalbuterol, sodium chloride nebulizer        PHYSICAL EXAMINATION:  /69   Pulse 86   Temp 97.3 °F (36.3 °C) (Oral)   Resp 16   Ht 5' 6\" (1.676 m)   Wt 146 lb 9.7 oz (66.5 kg)   SpO2 95%   BMI 23.66 kg/m²     General : Awake, alert, oriented x 3   Neck - supple, no lymphadenopathy, JVD not raised  Heart - regular rhythm, S1 and S2 normal; no additional sounds heard  Lungs - Air Entry- fair bilaterally; breath sounds : vesicular;   rales/crackles - absent  Abdomen - soft, no tenderness  Upper Extremities  - no cyanosis, mottling; edema : absent  Lower Extremities: no cyanosis, mottling; edema : absent    Current Laboratory, Radiologic, Microbiologic, and Diagnostic studies reviewed  Data ReviewCBC:   Recent Labs     01/24/21  0607 01/25/21 0523 01/26/21  0619   WBC 14.7* 27.8* 23.4*   RBC 4.40 4.13 4.33   HGB 13.3 12.3 12.9   HCT 40.2 38.8 39.9    240 267     BMP:   Recent Labs     01/24/21  0607 01/25/21  0523 01/26/21  0619   GLUCOSE 130* 127* 111*    137 140   K 4.6 5.0 4.5   BUN 16 21 29*   CREATININE 0.78 0.68 0.78   CALCIUM 9.8 9.8 9.8     ABGs: No results for input(s): PHART, PO2ART, DOK5VGN, WKC3RSF, BEART, W2FFTIIG, CLD5KPG in the last 72 hours. PT/INR:  No results found for: PTINR    ASSESSMENT / PLAN:    Pneumonia/ Pl effusion - ? Pneumonia/ parapneumonia- continue ABx; CXR 1/25- improved, no thoracentesis needed   RUL mass - appears smaller than 12/2020 - will d/w radiology  PET scan as OP- will arrange through office  Subcarinal adenopathy- ?  Reactive  COPD - BD  No objection to discharge   Discussed with Dr. Kev Hardy        Electronically signed by Roxanna Cortez on 01/26/21

## 2021-08-10 NOTE — TELEPHONE ENCOUNTER
[facility] Call Center    Phone Message    May a detailed message be left on voicemail: no     Reason for Call: Medication Question or concern regarding medication   Prescription Clarification  Name of Medication: pt was started on a progesterone.  Side affect is cramping and headaches.  Yesterday was really bad with migraine on left side.    Prescribing Provider: Bharati. Pt took 800mg ibuprofen all day and around 8 to8:30pm was getting another headaches.    Pharmacy:    [id number] [address] [facility] What on the order needs clarification? Pt asking for a call back to see if this medication which was started in June 2021 could be the cause of headaches.  Please call back.           Action Taken: Message routed to:  [facility] p [id number]    Travel Screening: Not Applicable

## 2021-08-11 NOTE — TELEPHONE ENCOUNTER
RN called and spoke with patient, relaying provider notes. Patient verbalized understanding and had no further questions.     States is feeling super today. Is still taking aygestin as she didn't stop. RN encouraged pt to keep a personal record of her migraine and any further ones and to pay attention to dietary changes or stressors in correlation to migraines if another occurs.  Pt will reach out to Dr. Calabrese if experiences another migraine as well.    PRISCILLA Hirsch RN

## 2021-08-11 NOTE — TELEPHONE ENCOUNTER
While it is possible it was the aygestin that caused the migraine, that is an unusual side effect.  It is up to her to decide if she wants to try taking it again.  If she decides against it or the headache returns when she tries it again, then I would be hesitant to try her on an estrogen containing hormonal medication, as those are more often related to serious migraine complications.  In that case we may need to reconsider and think about the surgical options we discussed.

## 2021-09-12 ENCOUNTER — HEALTH MAINTENANCE LETTER (OUTPATIENT)
Age: 46
End: 2021-09-12

## 2022-01-02 ENCOUNTER — HEALTH MAINTENANCE LETTER (OUTPATIENT)
Age: 47
End: 2022-01-02

## 2022-02-27 ENCOUNTER — HEALTH MAINTENANCE LETTER (OUTPATIENT)
Age: 47
End: 2022-02-27

## 2022-03-30 ENCOUNTER — OFFICE VISIT (OUTPATIENT)
Dept: FAMILY MEDICINE | Facility: CLINIC | Age: 47
End: 2022-03-30
Payer: COMMERCIAL

## 2022-03-30 VITALS
SYSTOLIC BLOOD PRESSURE: 130 MMHG | HEIGHT: 60 IN | DIASTOLIC BLOOD PRESSURE: 82 MMHG | OXYGEN SATURATION: 96 % | BODY MASS INDEX: 29.84 KG/M2 | TEMPERATURE: 98.3 F | HEART RATE: 92 BPM | WEIGHT: 152 LBS

## 2022-03-30 DIAGNOSIS — M25.561 RIGHT KNEE PAIN, UNSPECIFIED CHRONICITY: Primary | ICD-10-CM

## 2022-03-30 PROCEDURE — 99213 OFFICE O/P EST LOW 20 MIN: CPT | Performed by: PHYSICIAN ASSISTANT

## 2022-03-30 NOTE — PROGRESS NOTES
Assessment & Plan       ICD-10-CM    1. Right knee pain, unspecified chronicity  M25.561    Talk to patient about her concerns.  At this point I do suspect possible continuing medial meniscus pathology.  Her exam is fairly benign.  I think we have time to treat this conservatively.  We talked about ice heat ibuprofen activity modification.  We will see how this does over the next couple weeks to a month.  If she continues to have pain we will consider an MRI    Return in about 4 weeks (around 4/27/2022) for recheck, As Needed.    Lazaro Guillermo PA-C  [facility] EMILIANO Rogers is a 47 year old who presents for the following health issues     History of Present Illness       Reason for visit:  Tweaked my left knee  Symptom onset:  1-3 days ago  Symptoms include:  Knee  Symptom intensity:  Mild  Symptom progression:  Worsening  Had these symptoms before:  Yes  Has tried/received treatment for these symptoms:  Yes  Previous treatment was successful:  Yes  Prior treatment description:  None  What makes it worse:  Turning my knee the wrong way  What makes it better:  No    She eats 2-3 servings of fruits and vegetables daily.She consumes 1 sweetened beverage(s) daily.She exercises with enough effort to increase her heart rate 9 or less minutes per day.  She exercises with enough effort to increase her heart rate 3 or less days per week.   She is taking medications regularly.  Mild pain with walking.  She has an old knee wrap that she been using which helps.  Felt a pop in the knee 4 yrs ago stepping up on a step. Had MRI and showed meniscus tear and treated it conservativlely.     She states overall her pain is not that bad she is worried that she do more damage.    Review of Systems   Constitutional, HEENT, cardiovascular, pulmonary, gi and gu systems are negative, except as otherwise noted.      Objective    /82   Pulse 92   Temp 98.3  F (36.8  C) (Tympanic)   Ht 1.524 m  (5')   Wt 68.9 kg (152 lb)   SpO2 96%   BMI 29.69 kg/m    Body mass index is 29.69 kg/m .  Physical Exam   GENERAL: healthy, alert and no distress  Left knee small joint effusion with medial joint line tenderness.  She has full range of motion.  She has stability in her valgus varus and Lachman's.  She has mildly positive Ray's.  Calf soft nontender she neuro vas intact distally.      MRI reviewed from 2018.

## 2022-07-19 DIAGNOSIS — N92.1 MENOMETRORRHAGIA: ICD-10-CM

## 2022-07-19 RX ORDER — NORETHINDRONE ACETATE 5 MG
5 TABLET ORAL DAILY
Qty: 90 TABLET | Refills: 0 | Status: SHIPPED | OUTPATIENT
Start: 2022-07-19 | End: 2022-11-17

## 2022-07-19 NOTE — TELEPHONE ENCOUNTER
[facility] Call Center    Phone Message    May a detailed message be left on voicemail: yes     Reason for Call: Medication Refill Request    Has the patient contacted the pharmacy for the refill? Yes   Name of medication being requested: norethindrone (AYGESTIN) 5 MG tablet   Provider who prescribed the medication: Dr. Calabrese  Pharmacy: [id number] [address] [facility]  Date medication is needed: ASAP - Pt has 3 pills left.  Pt primary did not want to refill medication.  Pt is happy with how the medication is working concerning her bleeding and has not had any concerns.  She does have follow up appointment with Dr. Calabrese scheduled for 8/18/22.  Pt was not sure if she needed to be seen in order to get refills or not.  Please call her to discuss if she needs appointment in order for medication to be refilled, or if maybe a phone consult would be ok.  Please also call to confirm once medication has been sent to her pharmacy.         Action Taken: Message routed to:  [facility] p [id number]    Travel Screening: Not Applicable

## 2022-07-19 NOTE — TELEPHONE ENCOUNTER
Requested Prescriptions   Pending Prescriptions Disp Refills     norethindrone (AYGESTIN) 5 MG tablet 90 tablet 0     Sig: Take 1 tablet (5 mg) by mouth daily       There is no refill protocol information for this order        Pt last seen 6/28/2021 for heavy/irregular periods    Last prescribed 6/28/2021 for 90 tablets with 3 refills    Next appt on 8/18/2022 with Dr. Calabrese    Routing refill request to provider for review/approval because:  Drug not on the [facility] refill protocol and per pt-pt's primary provider had wanted pt to be off this medication, pt states her bleeding is well controlled with this medication.    Clara Patel RN on 7/19/2022 at 1:44 PM

## 2022-11-09 DIAGNOSIS — N92.1 MENOMETRORRHAGIA: ICD-10-CM

## 2022-11-17 ENCOUNTER — OFFICE VISIT (OUTPATIENT)
Dept: OBGYN | Facility: CLINIC | Age: 47
End: 2022-11-17
Payer: COMMERCIAL

## 2022-11-17 VITALS
SYSTOLIC BLOOD PRESSURE: 165 MMHG | HEART RATE: 102 BPM | WEIGHT: 157.8 LBS | DIASTOLIC BLOOD PRESSURE: 82 MMHG | BODY MASS INDEX: 30.82 KG/M2 | OXYGEN SATURATION: 98 %

## 2022-11-17 DIAGNOSIS — Z12.31 ENCOUNTER FOR SCREENING MAMMOGRAM FOR BREAST CANCER: ICD-10-CM

## 2022-11-17 DIAGNOSIS — Z12.4 CERVICAL CANCER SCREENING: ICD-10-CM

## 2022-11-17 DIAGNOSIS — Z12.31 VISIT FOR SCREENING MAMMOGRAM: ICD-10-CM

## 2022-11-17 DIAGNOSIS — N92.1 MENOMETRORRHAGIA: ICD-10-CM

## 2022-11-17 PROCEDURE — 99214 OFFICE O/P EST MOD 30 MIN: CPT | Performed by: OBSTETRICS & GYNECOLOGY

## 2022-11-17 RX ORDER — NORETHINDRONE ACETATE 5 MG
5 TABLET ORAL DAILY
Qty: 90 TABLET | Refills: 3 | Status: SHIPPED | OUTPATIENT
Start: 2022-11-17 | End: 2023-04-18

## 2022-11-17 NOTE — PATIENT INSTRUCTIONS
If you have any questions regarding your visit, Please contact your care team.     [facility]: [phone]    To Schedule an Appointment 24/7  Call: [phone] HOU[facility] TELEPHONE NUMBER     Patrick Calabrese MD  Medical Director    Noam Brown-MU Chisholm-Surgery Scheduler  Chelita-Surgery Scheduler               Tuesday-[place]  7:30 a.m-4:30 p.m    Thursday-[place]  7:30 a.m-4:30 p.m    Typical Surgery Days: Tuesday or Friday [facility]  [address] [place] [address]  PH: [phone]     Imaging Scheduling all locations  PH: [phone]     [facility] Labor and Delivery  [address] [place] [address] [place] [address]  PH: [phone]    [facility]  [address] [place] [address]  PH: [phone] Fax      **Surgeries** Our Surgery Schedulers will contact you to schedule. If you do not receive a call within 3 business days, please call [phone].    Urgent Care locations:  [facility] Monday-Friday  10 am - 8 pm  Saturday and Sunday   9 am - 5 pm  Monday-Friday   10 am - 8 pm  Saturday and Sunday   9 am - 5 pm   [phone]   If you need a medication refill, please contact your pharmacy. Please allow 3 business days for your refill to be completed.  As always, Thank you for trusting us with your healthcare needs!    see additional instructions from your care team below

## 2022-11-17 NOTE — PROGRESS NOTES
Sue is a 47 year old   here for follow up of her irregular menses.  She reports it is well controlled on the Aygestin and denies any problems..  .  ROS: No urinary frequency or dysuria, bladder or kidney problems  ROS: Ten point review of systems was reviewed and negative except the above.    PMH: Her past medical, surgical, and obstetric histories were reviewed and are documented in their appropriate chart areas.    ALL/Meds: Her medication and allergy histories were reviewed and are documented in their appropriate chart areas.    SH/FMH: Reviewed and documented in the appropriate area.    PE: BP (!) 165/82 (BP Location: Left arm, Patient Position: Sitting, Cuff Size: Adult Regular)   Pulse 102   Wt 71.6 kg (157 lb 12.8 oz)   LMP  (LMP Unknown)   SpO2 98%   BMI 30.82 kg/m      Discussed the timing of cessation.  She can stay on this through menopause.  She can contact us if she starts having vasomotor symptoms.  We would have her hold the meds for a month and then check hormone levels..    A/P:   Sue presents with (Z12.31) Visit for screening mammogram  Comment:   Plan: MA SCREENING DIGITAL BILAT - Future  (s+30)            (Z12.4) Cervical cancer screening  Comment:   Plan:   Declines today    (N92.1) Menometrorrhagia  Comment: 30 minutes spent on the date of the encounter doing chart review, patient visit and documentation   Plan: norethindrone (AYGESTIN) 5 MG tablet                -    Orders Placed This Encounter   Procedures     MA SCREENING DIGITAL BILAT - Future  (s+30)         Patrick Calabrese MD FACOG

## 2022-11-19 ENCOUNTER — HEALTH MAINTENANCE LETTER (OUTPATIENT)
Age: 47
End: 2022-11-19

## 2023-02-07 RX ORDER — NORETHINDRONE ACETATE 5 MG
TABLET ORAL
Qty: 90 TABLET | Refills: 0 | OUTPATIENT
Start: 2023-02-07

## 2023-03-02 DIAGNOSIS — N92.1 MENOMETRORRHAGIA: ICD-10-CM

## 2023-03-02 RX ORDER — NORETHINDRONE ACETATE 5 MG
5 TABLET ORAL DAILY
Qty: 90 TABLET | Refills: 3 | Status: CANCELLED | OUTPATIENT
Start: 2023-03-02

## 2023-03-02 NOTE — TELEPHONE ENCOUNTER
Requested Prescriptions   Pending Prescriptions Disp Refills     norethindrone (AYGESTIN) 5 MG tablet 90 tablet 3     Sig: Take 1 tablet (5 mg) by mouth daily       There is no refill protocol information for this order        Pt last seen 11/17/2022 for med follow up    Last prescribed 11/17/2022 for 90 tablets with 3 refills    Refills remain at patient's pharmacy. Refill not appropriate at this time, because there are additional refills remaining on current prescription      RN gave pts instructions on how to transfer location of med if requesting.    Clara Patel RN on 3/2/2023 at 2:35 PM

## 2023-04-09 ENCOUNTER — HEALTH MAINTENANCE LETTER (OUTPATIENT)
Age: 48
End: 2023-04-09

## 2023-04-18 ENCOUNTER — OFFICE VISIT (OUTPATIENT)
Dept: FAMILY MEDICINE | Facility: CLINIC | Age: 48
End: 2023-04-18
Payer: COMMERCIAL

## 2023-04-18 VITALS
HEIGHT: 59 IN | TEMPERATURE: 98.3 F | SYSTOLIC BLOOD PRESSURE: 132 MMHG | WEIGHT: 164 LBS | DIASTOLIC BLOOD PRESSURE: 88 MMHG | RESPIRATION RATE: 16 BRPM | HEART RATE: 78 BPM | OXYGEN SATURATION: 97 % | BODY MASS INDEX: 33.06 KG/M2

## 2023-04-18 DIAGNOSIS — N92.1 MENOMETRORRHAGIA: ICD-10-CM

## 2023-04-18 DIAGNOSIS — H01.005 BLEPHARITIS OF LEFT LOWER EYELID, UNSPECIFIED TYPE: Primary | ICD-10-CM

## 2023-04-18 DIAGNOSIS — I10 BENIGN ESSENTIAL HYPERTENSION: ICD-10-CM

## 2023-04-18 PROCEDURE — 99214 OFFICE O/P EST MOD 30 MIN: CPT | Performed by: FAMILY MEDICINE

## 2023-04-18 RX ORDER — ERYTHROMYCIN 5 MG/G
0.5 OINTMENT OPHTHALMIC 2 TIMES DAILY
Qty: 7 G | Refills: 0 | Status: SHIPPED | OUTPATIENT
Start: 2023-04-18 | End: 2023-04-25

## 2023-04-18 RX ORDER — POTASSIUM CHLORIDE 750 MG/1
1 CAPSULE, EXTENDED RELEASE ORAL EVERY 12 HOURS
COMMUNITY
Start: 2022-08-23 | End: 2023-05-05 | Stop reason: DRUGHIGH

## 2023-04-18 RX ORDER — NORETHINDRONE ACETATE 5 MG
5 TABLET ORAL DAILY
Qty: 90 TABLET | Refills: 1 | Status: SHIPPED | OUTPATIENT
Start: 2023-04-18 | End: 2023-10-16

## 2023-04-18 ASSESSMENT — PAIN SCALES - GENERAL: PAINLEVEL: NO PAIN (0)

## 2023-04-18 NOTE — PROGRESS NOTES
ASSESSMENT / PLAN:  (H01.005) Blepharitis of left lower eyelid, unspecified type  (primary encounter diagnosis)  Plan: erythromycin (ROMYCIN) 5 MG/GM ophthalmic         ointment        Reveiwed risks and side effects of medication  Warm compressed/gentle massage and avoid make up until better. Follow-up complete exam in next month. Expected course and warning signs reviewed. Call/email with questions/concerns  Consider oral if worsening.     (N92.1) Menometrorrhagia  Comment: stable  Plan: norethindrone (AYGESTIN) 5 MG tablet        Refill given. Follow-up per ob/gyn.           Elaina Rogers is a 48 year old, presenting for the following health issues:  Eye Burning Left Eye (Swelling)  left lower eyelid swelling/crusting. No similar issues.   No changes in make up. No trauma. No exposure.  right eye ok.   No history ALLERGIC RHINITIS.   Occasionally burning. No pink eye contacts.   No glasses.  Eye exam 2 years ago - lasix.   Eye drop visine x1. bendaryl helpful.   Tried  Cortisone not helpful.       4/18/2023     9:17 AM   Additional Questions   Roomed by soraya         4/18/2023     9:17 AM   Patient Reported Additional Medications   Patient reports taking the following new medications none     History of Present Illness       Reason for visit:  Eye irritation  Symptom onset:  1-2 weeks ago  Symptoms include:  Skin is red, itchy and swollen  Symptom intensity:  Severe  Symptom progression:  Worsening  Had these symptoms before:  No  What makes it worse:  No  What makes it better:  Lightly rubbing it/benedryl    She eats 0-1 servings of fruits and vegetables daily.She consumes 0 sweetened beverage(s) daily.She exercises with enough effort to increase her heart rate 10 to 19 minutes per day.  She exercises with enough effort to increase her heart rate 3 or less days per week.   She is taking medications regularly.         Review of Systems         Objective    /88   Pulse 78   Temp 98.3  F (36.8  C)  "(Oral)   Resp 16   Ht 1.499 m (4' 11\")   Wt 74.4 kg (164 lb)   LMP  (LMP Unknown)   SpO2 97%   BMI 33.12 kg/m    Body mass index is 33.12 kg/m .  Physical Exam   GENERAL: healthy, alert and no distress  EYES: Eyes grossly normal to inspection, PERRL and conjunctivae and sclerae normal  EYES: mild redness later lower left eyelid. No warmth.   HENT: ear canals and TM's normal, nose and mouth without ulcers or lesions  RESP: lungs clear to auscultation - no rales, rhonchi or wheezes  CV: regular rate and rhythm, normal S1 S2, no S3 or S4, no murmur, click or rub, no peripheral edema and peripheral pulses strong  PSYCH: mentation appears normal, affect normal/bright          "

## 2023-05-04 ENCOUNTER — OFFICE VISIT (OUTPATIENT)
Dept: FAMILY MEDICINE | Facility: CLINIC | Age: 48
End: 2023-05-04
Payer: COMMERCIAL

## 2023-05-04 VITALS
HEIGHT: 59 IN | BODY MASS INDEX: 32.86 KG/M2 | HEART RATE: 82 BPM | TEMPERATURE: 98.4 F | SYSTOLIC BLOOD PRESSURE: 120 MMHG | WEIGHT: 163 LBS | DIASTOLIC BLOOD PRESSURE: 77 MMHG | OXYGEN SATURATION: 93 %

## 2023-05-04 DIAGNOSIS — Z01.818 PREOP GENERAL PHYSICAL EXAM: Primary | ICD-10-CM

## 2023-05-04 DIAGNOSIS — E65 ABDOMINAL PANNUS: ICD-10-CM

## 2023-05-04 DIAGNOSIS — I10 BENIGN ESSENTIAL HYPERTENSION: ICD-10-CM

## 2023-05-04 LAB
ANION GAP SERPL CALCULATED.3IONS-SCNC: 6 MMOL/L (ref 3–14)
BUN SERPL-MCNC: 11 MG/DL (ref 7–30)
CALCIUM SERPL-MCNC: 10.6 MG/DL (ref 8.5–10.1)
CHLORIDE BLD-SCNC: 104 MMOL/L (ref 94–109)
CO2 SERPL-SCNC: 28 MMOL/L (ref 20–32)
CREAT SERPL-MCNC: 0.75 MG/DL (ref 0.52–1.04)
GFR SERPL CREATININE-BSD FRML MDRD: >90 ML/MIN/1.73M2
GLUCOSE BLD-MCNC: 100 MG/DL (ref 70–99)
HGB BLD-MCNC: 13.7 G/DL (ref 11.7–15.7)
POTASSIUM BLD-SCNC: 3 MMOL/L (ref 3.4–5.3)
SODIUM SERPL-SCNC: 138 MMOL/L (ref 133–144)

## 2023-05-04 PROCEDURE — 85018 HEMOGLOBIN: CPT | Performed by: NURSE PRACTITIONER

## 2023-05-04 PROCEDURE — 80048 BASIC METABOLIC PNL TOTAL CA: CPT | Performed by: NURSE PRACTITIONER

## 2023-05-04 PROCEDURE — 36415 COLL VENOUS BLD VENIPUNCTURE: CPT | Performed by: NURSE PRACTITIONER

## 2023-05-04 PROCEDURE — 93000 ELECTROCARDIOGRAM COMPLETE: CPT | Performed by: NURSE PRACTITIONER

## 2023-05-04 PROCEDURE — 99214 OFFICE O/P EST MOD 30 MIN: CPT | Performed by: NURSE PRACTITIONER

## 2023-05-04 NOTE — PATIENT INSTRUCTIONS
For informational purposes only. Not to replace the advice of your health care provider. Copyright   2003,  [facility]. All rights reserved. Clinically reviewed by Jackelin Herndon MD. [facility] [id number] - REV .  Preparing for Your Surgery  Getting started  A nurse will call you to review your health history and instructions. They will give you an arrival time based on your scheduled surgery time. Please be ready to share:  Your doctor's clinic name and phone number  Your medical, surgical, and anesthesia history  A list of allergies and sensitivities  A list of medicines, including herbal treatments and over-the-counter drugs  Whether the patient has a legal guardian (ask how to send us the papers in advance)  Please tell us if you're pregnant--or if there's any chance you might be pregnant. Some surgeries may injure a fetus (unborn baby), so they require a pregnancy test. Surgeries that are safe for a fetus don't always need a test, and you can choose whether to have one.   If you have a child who's having surgery, please ask for a copy of Preparing for Your Child's Surgery.    Preparing for surgery  Within 10 to 30 days of surgery: Have a pre-op exam (sometimes called an H&P, or History and Physical). This can be done at a clinic or pre-operative center.  If you're having a , you may not need this exam. Talk to your care team.  At your pre-op exam, talk to your care team about all medicines you take. If you need to stop any medicines before surgery, ask when to start taking them again.  We do this for your safety. Many medicines can make you bleed too much during surgery. Some change how well surgery (anesthesia) drugs work.  Call your insurance company to let them know you're having surgery. (If you don't have insurance, call [phone].)  Call your clinic if there's any change in your health. This includes signs of a cold or flu (sore throat, runny nose, cough, rash, fever). It  also includes a scrape or scratch near the surgery site.  If you have questions on the day of surgery, call your hospital or surgery center.  Eating and drinking guidelines  For your safety: Unless your surgeon tells you otherwise, follow the guidelines below.  Eat and drink as usual until 8 hours before you arrive for surgery. After that, no food or milk.  Drink clear liquids until 2 hours before you arrive. These are liquids you can see through, like water, Gatorade, and Propel Water. They also include plain black coffee and tea (no cream or milk), candy, and breath mints. You can spit out gum when you arrive.  If you drink alcohol: Stop drinking it the night before surgery.  If your care team tells you to take medicine on the morning of surgery, it's okay to take it with a sip of water.  Preventing infection  Shower or bathe the night before and morning of your surgery. Follow the instructions your clinic gave you. (If no instructions, use regular soap.)  Don't shave or clip hair near your surgery site. We'll remove the hair if needed.  Don't smoke or vape the morning of surgery. You may chew nicotine gum up to 2 hours before surgery. A nicotine patch is okay.  Note: Some surgeries require you to completely quit smoking and nicotine. Check with your surgeon.  Your care team will make every effort to keep you safe from infection. We will:  Clean our hands often with soap and water (or an alcohol-based hand rub).  Clean the skin at your surgery site with a special soap that kills germs.  Give you a special gown to keep you warm. (Cold raises the risk of infection.)  Wear special hair covers, masks, gowns and gloves during surgery.  Give antibiotic medicine, if prescribed. Not all surgeries need antibiotics.  What to bring on the day of surgery  Photo ID and insurance card  Copy of your health care directive, if you have one  Glasses and hearing aids (bring cases)  You can't wear contacts during surgery  Inhaler and  eye drops, if you use them (tell us about these when you arrive)  CPAP machine or breathing device, if you use them  A few personal items, if spending the night  If you have . . .  A pacemaker, ICD (cardiac defibrillator) or other implant: Bring the ID card.  An implanted stimulator: Bring the remote control.  A legal guardian: Bring a copy of the certified (court-stamped) guardianship papers.  Please remove any jewelry, including body piercings. Leave jewelry and other valuables at home.  If you're going home the day of surgery  You must have a responsible adult drive you home. They should stay with you overnight as well.  If you don't have someone to stay with you, and you aren't safe to go home alone, we may keep you overnight. Insurance often won't pay for this.  After surgery  If it's hard to control your pain or you need more pain medicine, please call your surgeon's office.  Questions?   If you have any questions for your care team, list them here: _________________________________________________________________________________________________________________________________________________________________________ ____________________________________ ____________________________________ ____________________________________    How to Take Your Medication Before Surgery  - Take all of your medications before surgery except as noted below  - HOLD (do not take) your chlorthalidone on the morning of surgery.   - STOP taking all vitamins and herbal supplements 14 days before surgery.

## 2023-05-04 NOTE — PROGRESS NOTES
[facility]  [address] [facility] [address]  Phone: [phone]  Primary Provider: Radha Ndiaye  Pre-op Performing Provider: IRAM CUETO    PREOPERATIVE EVALUATION:  Today's date: 5/4/2023    Sue Westfall is a 48 year old female who presents for a preoperative evaluation.      5/4/2023     8:13 AM   Additional Questions   Roomed by wilfredo   Accompanied by self     Surgical Information:  Surgery/Procedure: Tummy Tuck  Surgery Location: [facility]  Surgeon: Dr. Bee  Surgery Date: 5/15/2023  Time of Surgery: TBD  Where patient plans to recover: At home with family  Fax number for surgical facility: [phone]    Assessment & Plan     The proposed surgical procedure is considered INTERMEDIATE risk.    Preop general physical exam  - Hemoglobin; Future  - EKG 12-lead complete w/read - Clinics  - Hemoglobin    Abdominal pannus    Benign essential hypertension  Chronic, stable.    - Basic metabolic panel  (Ca, Cl, CO2, Creat, Gluc, K, Na, BUN)         - No identified additional risk factors other than previously addressed    Antiplatelet or Anticoagulation Medication Instructions:   - Patient is on no antiplatelet or anticoagulation medications.    Additional Medication Instructions:   - Diuretics: HOLD on the day of surgery.    RECOMMENDATION:  APPROVAL GIVEN to proceed with proposed procedure, without further diagnostic evaluation.      Subjective     HPI related to upcoming procedure: Sue Westfall is a 49 yo female who presents for a preoperative exam.  She will be having abdominoplasty for desired improvement in the appearance of her abdomen.            4/27/2023     9:11 AM   Preop Questions   1. Have you ever had a heart attack or stroke? No   2. Have you ever had surgery on your heart or blood vessels, such as a stent placement, a coronary artery bypass, or surgery on an artery in your head, neck, heart, or legs? No   3. Do you  have chest pain with activity? No   4. Do you have a history of  heart failure? No   5. Do you currently have a cold, bronchitis or symptoms of other infection? No   6. Do you have a cough, shortness of breath, or wheezing? No   7. Do you or anyone in your family have previous history of blood clots? No   8. Do you or does anyone in your family have a serious bleeding problem such as prolonged bleeding following surgeries or cuts? No   9. Have you ever had problems with anemia or been told to take iron pills? YES - remote history, taking an iron supplement   10. Have you had any abnormal blood loss such as black, tarry or bloody stools, or abnormal vaginal bleeding? No   11. Have you ever had a blood transfusion? No   12. Are you willing to have a blood transfusion if it is medically needed before, during, or after your surgery? Yes   13. Have you or any of your relatives ever had problems with anesthesia? No   14. Do you have sleep apnea, excessive snoring or daytime drowsiness? No   15. Do you have any artifical heart valves or other implanted medical devices like a pacemaker, defibrillator, or continuous glucose monitor? No   16. Do you have artificial joints? No   17. Are you allergic to latex? No   18. Is there any chance that you may be pregnant? No       Health Care Directive:  Patient does not have a Health Care Directive or Living Will: No    Preoperative Review of :   reviewed - no record of controlled substances prescribed.      Status of Chronic Conditions:  See problem list for active medical problems.  Problems all longstanding and stable, except as noted/documented.  See ROS for pertinent symptoms related to these conditions.      Review of Systems  CONSTITUTIONAL: NEGATIVE for fever, chills, change in weight  INTEGUMENTARY/SKIN: NEGATIVE for worrisome rashes, moles or lesions  EYES: NEGATIVE for vision changes or irritation  ENT/MOUTH: NEGATIVE for ear, mouth and throat problems  RESP: NEGATIVE  "for significant cough or SOB  CV: NEGATIVE for chest pain, palpitations or peripheral edema  GI: NEGATIVE for nausea, abdominal pain, heartburn, or change in bowel habits  : NEGATIVE for frequency, dysuria, or hematuria  MUSCULOSKELETAL: NEGATIVE for significant arthralgias or myalgia  NEURO: NEGATIVE for weakness, dizziness or paresthesias  ENDOCRINE: NEGATIVE for temperature intolerance, skin/hair changes  HEME: NEGATIVE for bleeding problems  PSYCHIATRIC: NEGATIVE for changes in mood or affect    Patient Active Problem List    Diagnosis Date Noted     Benign essential hypertension 10/20/2016     Priority: Medium     Other iron deficiency anemia 12/31/2015     Priority: Medium     CARDIOVASCULAR SCREENING; LDL GOAL LESS THAN 160 06/21/2011     Priority: Medium      Past Medical History:   Diagnosis Date     Benign essential hypertension 10/20/2016     NO ACTIVE PROBLEMS      Other iron deficiency anemia 12/31/2015     Past Surgical History:   Procedure Laterality Date     NO HISTORY OF SURGERY       Current Outpatient Medications   Medication Sig Dispense Refill     chlorthalidone (HYGROTON) 50 MG tablet TAKE 1 TABLET BY MOUTH DAILY 30 tablet 0     norethindrone (AYGESTIN) 5 MG tablet Take 1 tablet (5 mg) by mouth daily 90 tablet 1     potassium chloride ER (MICRO-K) 10 MEQ CR capsule Take 1 capsule by mouth every 12 hours         Allergies   Allergen Reactions     Lisinopril Cough     Morphine Hcl      Itchy        Social History     Tobacco Use     Smoking status: Never     Smokeless tobacco: Never   Vaping Use     Vaping status: Never Used   Substance Use Topics     Alcohol use: Yes     Comment: occasional       History   Drug Use No         Objective     /77   Pulse 82   Temp 98.4  F (36.9  C)   Ht 1.499 m (4' 11\")   Wt 73.9 kg (163 lb)   LMP  (LMP Unknown)   SpO2 93%   BMI 32.92 kg/m      Physical Exam    GENERAL APPEARANCE: healthy, alert and no distress     EYES: EOMI, PERRL     HENT: nose " and mouth without ulcers or lesions     NECK: no adenopathy, no asymmetry, masses, or scars and thyroid normal to palpation     RESP: lungs clear to auscultation - no rales, rhonchi or wheezes     CV: regular rates and rhythm, normal S1 S2, no S3 or S4 and no murmur, click or rub     ABDOMEN:  soft, nontender, no HSM or masses and bowel sounds normal     MS: extremities normal- no gross deformities noted, no evidence of inflammation in joints, FROM in all extremities.     SKIN: no suspicious lesions or rashes     NEURO: Normal strength and tone, sensory exam grossly normal, mentation intact and speech normal     PSYCH: mentation appears normal. and affect normal/bright     LYMPHATICS: No cervical adenopathy         Diagnostics:  Labs pending at this time.  Results will be reviewed when available.     Potassium   Date Value Ref Range Status   05/15/2023 4.0 3.4 - 5.3 mmol/L Final   05/12/2023 3.0 (L) 3.4 - 5.3 mmol/L Final   08/15/2019 3.4 3.4 - 5.3 mmol/L Final     Creatinine   Date Value Ref Range Status   05/04/2023 0.75 0.52 - 1.04 mg/dL Final   08/15/2019 0.72 0.52 - 1.04 mg/dL Final     Hemoglobin   Date Value Ref Range Status   05/04/2023 13.7 11.7 - 15.7 g/dL Final   06/10/2021 11.5 (L) 11.7 - 15.7 g/dL Final       EKG: appears normal, NSR, Right Bundle Branch Block, no acute ST/T changes c/w ischemia.  EKG is unchanged from prior dated 2/10/2015 ([facility]).         Revised Cardiac Risk Index (RCRI):  The patient has the following serious cardiovascular risks for perioperative complications:   - No serious cardiac risks = 0 points     RCRI Interpretation: 0 points: Class I (very low risk - 0.4% complication rate)           Signed Electronically by: Chelsea Almaguer CNP  Copy of this evaluation report is provided to requesting physician.

## 2023-05-05 ENCOUNTER — TELEPHONE (OUTPATIENT)
Dept: FAMILY MEDICINE | Facility: CLINIC | Age: 48
End: 2023-05-05

## 2023-05-05 DIAGNOSIS — E87.6 HYPOKALEMIA: Primary | ICD-10-CM

## 2023-05-05 RX ORDER — POTASSIUM CHLORIDE 1500 MG/1
20 TABLET, EXTENDED RELEASE ORAL DAILY
Qty: 90 TABLET | Refills: 0 | Status: SHIPPED | OUTPATIENT
Start: 2023-05-05 | End: 2023-08-14

## 2023-05-05 NOTE — TELEPHONE ENCOUNTER
Patient called back. The below message from provider read to her.  She understands this and will  new potassium Rx today and start it.  Warm transferred patient to make lab appointment.     Leona GREENN, RN

## 2023-05-12 ENCOUNTER — NURSE TRIAGE (OUTPATIENT)
Dept: NURSING | Facility: CLINIC | Age: 48
End: 2023-05-12

## 2023-05-12 ENCOUNTER — LAB (OUTPATIENT)
Dept: LAB | Facility: CLINIC | Age: 48
End: 2023-05-12
Payer: COMMERCIAL

## 2023-05-12 DIAGNOSIS — E87.6 HYPOKALEMIA: ICD-10-CM

## 2023-05-12 LAB — POTASSIUM BLD-SCNC: 3 MMOL/L (ref 3.4–5.3)

## 2023-05-12 PROCEDURE — 84132 ASSAY OF SERUM POTASSIUM: CPT

## 2023-05-12 PROCEDURE — 36415 COLL VENOUS BLD VENIPUNCTURE: CPT

## 2023-05-13 NOTE — TELEPHONE ENCOUNTER
Pt calling as she still is unable to view K+ results in [employer].     Nurse through her surgeon's office is texting her to see what the results are as surgery is at 7 am on Monday    K+ results are in however there is no provider review so unable to give per protocol    Advised that on call provider would be paged to review results    On call provider Dr. Casey coleman. She gave verbal permission to give K+ result=3.0, she also gives verbal order to tell pt to increase PO potassium from 20 meq daily up to 20 meq TID for the next 2 days (today and tomorrow) and follow up on Monday with ordering provider       Provider Recommendation Follow Up:   Reached patient/caregiver. Informed of provider's recommendations. Patient verbalized understanding and agrees with the plan.         Izzy Falk RN [facility] May 13, 2023 5:04 PM

## 2023-05-13 NOTE — TELEPHONE ENCOUNTER
Triage Call:    Caller: Patient  Calling regarding potassium results, as if low can't have surgery on Monday.  Advised that provider hasn't reviewed them yet, so unable to give them out.  Advised that in about 50 minutes should be available on [employer] and she is going to try and get the most updated andria and re-log in.  Otherwise she will call back and speak with [employer] support.    Mounika Mcneill RN on 5/12/2023 at 7:18 PM    Reason for Disposition    Caller requesting lab results  (Exception: Routine or non-urgent lab result.)    Protocols used: PCP CALL - NO TRIAGE-A-

## 2023-05-15 ENCOUNTER — LAB REQUISITION (OUTPATIENT)
Dept: LAB | Facility: CLINIC | Age: 48
End: 2023-05-15
Payer: COMMERCIAL

## 2023-05-15 DIAGNOSIS — E87.6 HYPOKALEMIA: ICD-10-CM

## 2023-05-15 LAB — POTASSIUM SERPL-SCNC: 4 MMOL/L (ref 3.4–5.3)

## 2023-05-15 PROCEDURE — 84132 ASSAY OF SERUM POTASSIUM: CPT | Mod: ORL | Performed by: PLASTIC SURGERY

## 2023-10-16 DIAGNOSIS — N92.1 MENOMETRORRHAGIA: ICD-10-CM

## 2023-10-16 RX ORDER — NORETHINDRONE ACETATE 5 MG
5 TABLET ORAL DAILY
Qty: 90 TABLET | Refills: 0 | Status: SHIPPED | OUTPATIENT
Start: 2023-10-16 | End: 2024-04-08

## 2023-12-21 ENCOUNTER — OFFICE VISIT (OUTPATIENT)
Dept: URGENT CARE | Facility: URGENT CARE | Age: 48
End: 2023-12-21
Payer: COMMERCIAL

## 2023-12-21 VITALS
HEART RATE: 94 BPM | OXYGEN SATURATION: 96 % | DIASTOLIC BLOOD PRESSURE: 106 MMHG | RESPIRATION RATE: 18 BRPM | TEMPERATURE: 99.1 F | SYSTOLIC BLOOD PRESSURE: 156 MMHG

## 2023-12-21 DIAGNOSIS — J06.9 VIRAL URI WITH COUGH: Primary | ICD-10-CM

## 2023-12-21 DIAGNOSIS — I10 HYPERTENSION, UNSPECIFIED TYPE: ICD-10-CM

## 2023-12-21 DIAGNOSIS — R07.0 THROAT PAIN: ICD-10-CM

## 2023-12-21 DIAGNOSIS — R05.1 ACUTE COUGH: ICD-10-CM

## 2023-12-21 LAB
DEPRECATED S PYO AG THROAT QL EIA: NEGATIVE
FLUAV AG SPEC QL IA: NEGATIVE
FLUBV AG SPEC QL IA: NEGATIVE
GROUP A STREP BY PCR: NOT DETECTED

## 2023-12-21 PROCEDURE — 99214 OFFICE O/P EST MOD 30 MIN: CPT | Performed by: NURSE PRACTITIONER

## 2023-12-21 PROCEDURE — 87635 SARS-COV-2 COVID-19 AMP PRB: CPT | Performed by: NURSE PRACTITIONER

## 2023-12-21 PROCEDURE — 87651 STREP A DNA AMP PROBE: CPT | Performed by: NURSE PRACTITIONER

## 2023-12-21 PROCEDURE — 87804 INFLUENZA ASSAY W/OPTIC: CPT | Performed by: NURSE PRACTITIONER

## 2023-12-21 RX ORDER — LOSARTAN POTASSIUM AND HYDROCHLOROTHIAZIDE 25; 100 MG/1; MG/1
1 TABLET ORAL DAILY
COMMUNITY
Start: 2023-12-19

## 2023-12-21 RX ORDER — BENZONATATE 100 MG/1
100 CAPSULE ORAL EVERY 8 HOURS PRN
Qty: 24 CAPSULE | Refills: 0 | Status: SHIPPED | OUTPATIENT
Start: 2023-12-21

## 2023-12-21 NOTE — LETTER
December 21, 2023      Sue Westfall  [address] [address] [address] [address]        To Whom It May Concern:    Sue Westfall  was seen on 12/21/23.  Please excuse her  until symptoms improving for 24-hour due to illness.        Sincerely,        ARIANNE Zarate

## 2023-12-21 NOTE — PROGRESS NOTES
Assessment & Plan       ICD-10-CM    1. Viral URI with cough  J06.9       2. Throat pain  R07.0 Streptococcus A Rapid Screen w/Reflex to PCR     Symptomatic COVID-19 Virus (Coronavirus) by PCR Nose     Group A Streptococcus PCR Throat Swab     Influenza A & B Antigen     CANCELED: Influenza A & B Antigen - Clinic Collect     CANCELED: Influenza A & B Antigen - Clinic Collect      3. Acute cough  R05.1 benzonatate (TESSALON) 100 MG capsule      4. Hypertension, unspecified type  I10              Reviewed negative rapid strep results during visit, PCR testing in process and COVID test in process, will notify if positive. Influenza negative. Discussed symptoms likely viral in nature and antibiotic not indicated at this time. Recommended rest, fluids, tylenol as needed, gargle warm salt water, throat lozenges, decreasing dairy which can increase congestion, humidifier, steam, nasal irrigation with saline, flonase nasal spray. Stop afrin. Prescription sent to pharmacy for tessalon as needed for cough. Self-quarantine recommended.    Patient is hypertensive today but asymptomatic.  No blurring of vision, chest pain, shortness of breath, dizziness, numbness, or weakness. Second BP reading was also above goal.  Patient instructed to follow up with PCP within the next week for BP recheck as untreated HTN may result in life threatening conditions.  Instructed to go to emergency department if develops chest pain, shortness of breath, dizziness, vision changes, numbness, weakness. She has appt scheduled tomorrow.     Follow-up with PCP if symptoms persist for 7 days, and sooner if symptoms worsen or new symptoms develop.     Discussed red flag symptoms which warrant immediate visit in emergency room    All questions were answered and patient verbalized understanding. AVS reviewed with patient.     Tessa Weldon, DNP, APRN, CNP 12/21/2023 3:08 PM  [facility]          Elaina Rogers is a 48 year  old female who presents to clinic today for the following health issues:  Chief Complaint   Patient presents with    Cough     Since last Wednesday: sore throat, ear pain, plugged nose, congestion, headache.   Letter for work      Patient presents for evaluation of sore throat. Associated symptoms: cough, ear pain, congestion, headache. Symptoms have been present for 8 days and have been worsening. Cough and congestion started yesterday.  Cough is dry and has been waking her at night. No known fever, chest pain, shortness of breath. She has been taking Nyquil, Dayquil, allergy medication which helps temporarily.     She has a history of HTN.    Problem list, Medication list, Allergies, and Medical history reviewed in EPIC.    ROS:  Review of systems negative except for noted above        Objective    BP (!) 156/106   Pulse 94   Temp 99.1  F (37.3  C) (Tympanic)   Resp 18   SpO2 96%   Physical Exam  Constitutional:       General: She is not in acute distress.     Appearance: She is not toxic-appearing or diaphoretic.   HENT:      Head: Normocephalic and atraumatic.      Right Ear: Tympanic membrane, ear canal and external ear normal.      Left Ear: Tympanic membrane, ear canal and external ear normal.      Nose:      Right Sinus: No maxillary sinus tenderness or frontal sinus tenderness.      Left Sinus: No maxillary sinus tenderness or frontal sinus tenderness.      Comments: Moderate nasal congestion     Mouth/Throat:      Mouth: Mucous membranes are moist.      Pharynx: Oropharynx is clear. Posterior oropharyngeal erythema present. No oropharyngeal exudate.      Tonsils: No tonsillar abscesses. 1+ on the right. 1+ on the left.      Comments: Mild oropharyngeal erythema  Eyes:      Conjunctiva/sclera: Conjunctivae normal.   Cardiovascular:      Rate and Rhythm: Normal rate and regular rhythm.      Heart sounds: Normal heart sounds.   Pulmonary:      Effort: Pulmonary effort is normal. No respiratory distress.       Breath sounds: Normal breath sounds. No wheezing, rhonchi or rales.      Comments: Episodic cough  Lymphadenopathy:      Cervical: No cervical adenopathy.   Skin:     General: Skin is warm and dry.   Neurological:      Mental Status: She is alert.              Labs:  Results for orders placed or performed in visit on 12/21/23   Streptococcus A Rapid Screen w/Reflex to PCR     Status: Normal    Specimen: Throat; Swab   Result Value Ref Range    Group A Strep antigen Negative Negative   Influenza A & B Antigen     Status: Normal    Specimen: Nose; Swab   Result Value Ref Range    Influenza A antigen Negative Negative    Influenza B antigen Negative Negative    Narrative    Test results must be correlated with clinical data. If necessary, results should be confirmed by a molecular assay or viral culture.

## 2023-12-21 NOTE — PATIENT INSTRUCTIONS
No more afrin.    Rest, fluids especially warm clear liquids such as tea with honey and lemon, decreasing dairy which can increase congestion, humidifier, steam, nasal irrigation with saline, flonase nasal spray.

## 2023-12-22 LAB — SARS-COV-2 RNA RESP QL NAA+PROBE: NEGATIVE

## 2024-04-03 NOTE — PATIENT INSTRUCTIONS
If you have any questions regarding your visit, Please contact your care team.     [employer] [facility]: [phone]    To Schedule an Appointment 24/7  [facility]: [phone]     Patrick Calabrese MD  Medical Director        Clara-MU Brown-RN  Rita Chisholm-Surgery Scheduler  Chelita-Surgery Scheduler               Tuesday-[place]  7:30 a.m-4:30 p.m    Thursday-[place]  7:30 a.m-4:30 p.m    Typical Surgery Days: Tuesday or Friday [facility]  [address] [place] [address]  PH: [phone]    Imaging Scheduling all locations  PH: [phone]     [facility]  [address] [place] [address] [place] [address]  PH: [phone]    [facility]  [address] [place] [address]  PH: [phone] Fax      **Surgeries** Our Surgery Schedulers will contact you to schedule. If you do not receive a call within 3 business days, please call [phone].    Urgent Care locations:  [facility] Monday-Friday  10 am - 8 pm  Saturday and Sunday   9 am - 5 pm  Monday-Friday   10 am - 8 pm  Saturday and Sunday   9 am - 5 pm   [phone]   If you need a medication refill, please contact your pharmacy. Please allow 3 business days for your refill to be completed.  As always, Thank you for trusting us with your healthcare needs!    see additional instructions from your care team below

## 2024-04-04 ENCOUNTER — PATIENT OUTREACH (OUTPATIENT)
Dept: FAMILY MEDICINE | Facility: CLINIC | Age: 49
End: 2024-04-04
Payer: COMMERCIAL

## 2024-04-04 NOTE — TELEPHONE ENCOUNTER
Patient Quality Outreach    Patient is due for the following:   Breast Cancer Screening - Mammogram  Physical Preventive Adult Physical    Next Steps:       Type of outreach:    Sent [employer] message.      Questions for provider review:               Cheryl Gibson, CMA

## 2024-04-08 ENCOUNTER — OFFICE VISIT (OUTPATIENT)
Dept: OBGYN | Facility: CLINIC | Age: 49
End: 2024-04-08
Payer: COMMERCIAL

## 2024-04-08 VITALS
OXYGEN SATURATION: 96 % | HEART RATE: 94 BPM | BODY MASS INDEX: 34.78 KG/M2 | DIASTOLIC BLOOD PRESSURE: 84 MMHG | WEIGHT: 172.2 LBS | SYSTOLIC BLOOD PRESSURE: 139 MMHG

## 2024-04-08 DIAGNOSIS — I10 BENIGN ESSENTIAL HYPERTENSION: ICD-10-CM

## 2024-04-08 DIAGNOSIS — Z12.4 SCREENING FOR MALIGNANT NEOPLASM OF CERVIX: ICD-10-CM

## 2024-04-08 DIAGNOSIS — Z01.419 ENCOUNTER FOR GYNECOLOGICAL EXAMINATION WITHOUT ABNORMAL FINDING: ICD-10-CM

## 2024-04-08 DIAGNOSIS — N92.1 MENOMETRORRHAGIA: ICD-10-CM

## 2024-04-08 DIAGNOSIS — Z12.31 VISIT FOR SCREENING MAMMOGRAM: Primary | ICD-10-CM

## 2024-04-08 PROCEDURE — 99396 PREV VISIT EST AGE 40-64: CPT | Performed by: OBSTETRICS & GYNECOLOGY

## 2024-04-08 PROCEDURE — 87624 HPV HI-RISK TYP POOLED RSLT: CPT | Performed by: OBSTETRICS & GYNECOLOGY

## 2024-04-08 PROCEDURE — G0145 SCR C/V CYTO,THINLAYER,RESCR: HCPCS | Performed by: OBSTETRICS & GYNECOLOGY

## 2024-04-08 PROCEDURE — 99459 PELVIC EXAMINATION: CPT | Performed by: OBSTETRICS & GYNECOLOGY

## 2024-04-08 RX ORDER — NORETHINDRONE ACETATE 5 MG
5 TABLET ORAL DAILY
Qty: 90 TABLET | Refills: 3 | Status: SHIPPED | OUTPATIENT
Start: 2024-04-08

## 2024-04-08 NOTE — PROGRESS NOTES
Sue is a 49 year old  here for annual exam. She is currently using POP for birth control.      ROS: Ten point review of systems was reviewed and negative except the above.      Last paps:    Lab Results   Component Value Date    PAP NIL 2018    PAP NIL 2014     Last cholesterol:   Cholesterol   Date Value Ref Range Status   2018 163 <200 mg/dL Final   ]        Past Surgical History:   Procedure Laterality Date    NO HISTORY OF SURGERY       Past Medical History:   Diagnosis Date    Benign essential hypertension 10/20/2016    NO ACTIVE PROBLEMS     Other iron deficiency anemia 2015        .     Allergies   Allergen Reactions    Lisinopril Cough    Morphine Hcl      Itchy       Family History   Problem Relation Age of Onset    Diabetes Father     Diabetes Paternal Grandmother     Diabetes Paternal Grandfather      Social History     Socioeconomic History    Marital status:      Spouse name: Not on file    Number of children: Not on file    Years of education: Not on file    Highest education level: Not on file   Occupational History    Not on file   Tobacco Use    Smoking status: Never    Smokeless tobacco: Never   Vaping Use    Vaping Use: Never used   Substance and Sexual Activity    Alcohol use: Not Currently     Comment: occasional    Drug use: No    Sexual activity: Yes     Partners: Male     Birth control/protection: Pill   Other Topics Concern    Parent/sibling w/ CABG, MI or angioplasty before 65F 55M? Not Asked   Social History Narrative    Not on file     Social Determinants of Health     Financial Resource Strain: Not on file   Food Insecurity: Not on file   Transportation Needs: Not on file   Physical Activity: Not on file   Stress: Not on file   Social Connections: Not on file   Interpersonal Safety: Not on file   Housing Stability: Not on file           PE: /84 (BP Location: Left arm, Patient Position: Sitting, Cuff Size: Adult Regular)   Pulse 94   Wt 78.1  kg (172 lb 3.2 oz)   LMP 03/08/2024 (Exact Date)   SpO2 96%   BMI 34.78 kg/m    Body mass index is 34.78 kg/m .    General Appearance:  healthy, alert, active, no distress  Cardiovascular:  Regular rate and Rhythm  Neck: Supple, no adenopathy, and thyroid normal  Lungs:  Clear, without wheeze, rale or rhonchi  Breast: normal breast exam  Abdomen: Benign, Soft, flat, non-tender, No masses, organomegaly, No inguinal nodes, and Bowel sounds normoactive.   Pelvic:       - Ext: Vulva and perineum are normal without lesion, mass or discharge        - Urethra: normal without discharge or scarring no hypermobility       - Bladder: No tenderness, No masses       - Vagina: without discharge and rugated       - Cervix: normal       - Uterus:Normal shape, position and consistency       - Adnexa: Normal without masses or tenderness       - Rectal: deferred    A/P Well Woman,      -  I discussed the new pap recommendations regarding screening.  Explained the rationale for increased intervals between paps.  Questions asked and answered.  She does agree to this regiment.    - Pap was performed   -  BC: POP     - Labs: No orders of the defined types were placed in this encounter.      -  Encouraged healthy diet, regular exercise, self-breast examination.  Patrick Calabrese MD FACOG

## 2024-04-10 LAB
BKR LAB AP GYN ADEQUACY: NORMAL
BKR LAB AP GYN INTERPRETATION: NORMAL
BKR LAB AP HPV REFLEX: NORMAL
BKR LAB AP PREVIOUS ABNORMAL: NORMAL
PATH REPORT.COMMENTS IMP SPEC: NORMAL
PATH REPORT.COMMENTS IMP SPEC: NORMAL
PATH REPORT.RELEVANT HX SPEC: NORMAL

## 2024-04-12 LAB
HUMAN PAPILLOMA VIRUS 16 DNA: NEGATIVE
HUMAN PAPILLOMA VIRUS 18 DNA: NEGATIVE
HUMAN PAPILLOMA VIRUS FINAL DIAGNOSIS: NORMAL
HUMAN PAPILLOMA VIRUS OTHER HR: NEGATIVE

## 2024-06-21 ENCOUNTER — ANCILLARY PROCEDURE (OUTPATIENT)
Dept: MAMMOGRAPHY | Facility: CLINIC | Age: 49
End: 2024-06-21
Attending: FAMILY MEDICINE
Payer: COMMERCIAL

## 2024-06-21 DIAGNOSIS — Z12.31 VISIT FOR SCREENING MAMMOGRAM: ICD-10-CM

## 2024-06-21 PROCEDURE — 77067 SCR MAMMO BI INCL CAD: CPT | Mod: TC | Performed by: RADIOLOGY

## 2024-06-21 PROCEDURE — 77063 BREAST TOMOSYNTHESIS BI: CPT | Mod: TC | Performed by: RADIOLOGY

## 2024-08-15 NOTE — TELEPHONE ENCOUNTER
----- Message from Chelsea Almaguer CNP sent at 5/5/2023  7:50 AM CDT -----  Please call patient, important that she knows right away, so she can start supplement today.     Guerrero Rogers,     Your potassium is too low.  This needs to be improved before surgery.  Based on your [facility] chart, it looks like they are prescribing you potassium 10 meq once daily.  I am going to send in a prescription to increase this to 20 meq once daily- this went to [facility].   Please return for a lab only visit in the next 5-7 days to recheck your potassium level.  As long as it is going up, it should be okay.       Chelsea Almaguer CNP    [Normal Breath Sounds] : Normal breath sounds [Normal Heart Sounds] : normal heart sounds [Alert] : alert [Oriented to Person] : oriented to person [Oriented to Place] : oriented to place [Oriented to Time] : oriented to time [Calm] : calm [Abdomen Masses] : No abdominal masses [Abdomen Tenderness] : ~T No ~M abdominal tenderness [de-identified] : WNL [de-identified] : WNL [de-identified] : JIMMYL [de-identified] : WNL [de-identified] : WNL ROM

## 2025-04-02 ENCOUNTER — NURSE TRIAGE (OUTPATIENT)
Dept: INTERNAL MEDICINE | Facility: CLINIC | Age: 50
End: 2025-04-02

## 2025-04-02 NOTE — TELEPHONE ENCOUNTER
"1st attempt to reach pt. Left VM to please return a call to [phone].    When pt returns call: Please triage \"Diverticulitis flare up. Scale of 1-10 pain I m at a 10.\"    Thank you - Kristen Burger, BSN, RN      "

## 2025-04-02 NOTE — CONFIDENTIAL NOTE
Patient called and cancelled her appointment for [employer] today 4/2/2025 for her lower abdominal pain.  Per patient, her doctor at [facility] is refilling her prescription so she no longer needs the appointment.   Patient will call back if symptoms worsen.     Routing to provider as an FYI.       Rosa Isela GREENN,  RN  [facility]

## 2025-04-02 NOTE — TELEPHONE ENCOUNTER
Patient reports that she woke up at about 1:00 am today with 10/10 left lower abdominal pain. The pain is constant. She reports that she has a hx of diverticulitis. She reports that she ate a little popcorn last night. She went back and forth to that bathroom trying to have a BM, just a little came out. She reports that she got flushed and thought she was going to passing out. She was sweating. She woke up in a pool of sweat. Currently the pain is 5-6/10. She felt nauseated and wanted to vomit. She did not.     Hx diverticulitis.    She primarily see [facility]. Medications: Amlodipine 5 mg once daily and metoprolol 50 mg once daily, citalopram 10 mg once daily - started that this week.     Denies: fever    Nursing advice: We will call [facility] to see if they can take her on. The patient was in agreement with this plan. Patient was given signs and symptoms to go to the E.R. Patient verbalizes good understanding, agrees with plan and states she needs no further support. Becky Simon R.N.    Call placed to [facility] @ 9:38 am. They were given the above information and state that they will take her to assess today. They will reach out to the patient to give details. Thank you. Becky Simon R.N.    [facility]  [address] [place] [address]  Go to check-in C  [phone]    Reason for Disposition   MILD TO MODERATE constant pain lasting > 2 hours    Additional Information   Negative: Passed out (e.g., fainted, lost consciousness, blacked out and was not responding)   Negative: Shock suspected (e.g., cold/pale/clammy skin, too weak to stand, low BP, rapid pulse)   Negative: Sounds like a life-threatening emergency to the triager   Negative: SEVERE abdominal pain (e.g., excruciating)   Negative: Vomiting red blood or black (coffee ground) material   Negative: Blood in bowel movements  (Exception: Blood on surface of BM with constipation.)   Negative: Black or tarry bowel movements  (Exception: Chronic-unchanged  black-grey BMs AND is taking iron pills or Pepto-Bismol.)    Protocols used: Abdominal Pain - Female-A-[place]

## 2025-04-02 NOTE — TELEPHONE ENCOUNTER
Please call patient.    Needs [facility]- on my scheduled for 10/10 abdominal pain, clinic is not the appropriate place to be seen.  Needs either ER or [facility].   Chelsea Almaguer, CNP

## 2025-05-25 ENCOUNTER — HEALTH MAINTENANCE LETTER (OUTPATIENT)
Age: 50
End: 2025-05-25

## 2025-07-17 DIAGNOSIS — N92.1 MENOMETRORRHAGIA: ICD-10-CM

## 2025-07-17 RX ORDER — NORETHINDRONE 5 MG/1
5 TABLET ORAL DAILY
Qty: 90 TABLET | Refills: 0 | Status: SHIPPED | OUTPATIENT
Start: 2025-07-17

## 2025-07-17 NOTE — TELEPHONE ENCOUNTER
Requested Prescriptions   Pending Prescriptions Disp Refills    norethindrone (AYGESTIN) 5 MG tablet [Pharmacy Med Name: NORETHINDRONE ACETATE 5MG TABS] 90 tablet 3     Sig: TAKE ONE TABLET BY MOUTH ONCE DAILY       There is no refill protocol information for this order        Pt last seen 4/8/2024 for annual    Last prescribed 4/8/2024 for 90 tablets with 3 refills    RN sent mychart reminder to schedule yearly med check    Clara Patel RN on 7/17/2025 at 10:25 AM